# Patient Record
Sex: MALE | Race: WHITE | Employment: FULL TIME | URBAN - METROPOLITAN AREA
[De-identification: names, ages, dates, MRNs, and addresses within clinical notes are randomized per-mention and may not be internally consistent; named-entity substitution may affect disease eponyms.]

---

## 2020-12-20 LAB — COLOGUARD TEST, EXTERNAL: NEGATIVE

## 2023-03-18 ENCOUNTER — APPOINTMENT (OUTPATIENT)
Dept: CT IMAGING | Age: 68
DRG: 041 | End: 2023-03-18
Attending: EMERGENCY MEDICINE
Payer: MEDICARE

## 2023-03-18 ENCOUNTER — HOSPITAL ENCOUNTER (INPATIENT)
Age: 68
LOS: 4 days | Discharge: NURSING, REHAB OR LONG-TERM CARE | DRG: 041 | End: 2023-03-22
Attending: EMERGENCY MEDICINE | Admitting: FAMILY MEDICINE
Payer: MEDICARE

## 2023-03-18 ENCOUNTER — APPOINTMENT (OUTPATIENT)
Dept: GENERAL RADIOLOGY | Age: 68
DRG: 041 | End: 2023-03-18
Attending: EMERGENCY MEDICINE
Payer: MEDICARE

## 2023-03-18 DIAGNOSIS — R53.1 RIGHT SIDED WEAKNESS: ICD-10-CM

## 2023-03-18 DIAGNOSIS — Z91.14 NON COMPLIANCE W MEDICATION REGIMEN: ICD-10-CM

## 2023-03-18 DIAGNOSIS — I63.9 CEREBROVASCULAR ACCIDENT (CVA), UNSPECIFIED MECHANISM (HCC): Primary | ICD-10-CM

## 2023-03-18 DIAGNOSIS — R47.1 DYSARTHRIA: ICD-10-CM

## 2023-03-18 LAB
ALBUMIN SERPL-MCNC: 4.2 G/DL (ref 3.5–5)
ALBUMIN/GLOB SERPL: 1.2 (ref 1.1–2.2)
ALP SERPL-CCNC: 64 U/L (ref 45–117)
ALT SERPL-CCNC: 28 U/L (ref 12–78)
ANION GAP SERPL CALC-SCNC: 4 MMOL/L (ref 5–15)
AST SERPL-CCNC: 17 U/L (ref 15–37)
BASOPHILS # BLD: 0 K/UL (ref 0–0.1)
BASOPHILS NFR BLD: 1 % (ref 0–1)
BILIRUB SERPL-MCNC: 0.5 MG/DL (ref 0.2–1)
BUN SERPL-MCNC: 17 MG/DL (ref 6–20)
BUN/CREAT SERPL: 13 (ref 12–20)
CALCIUM SERPL-MCNC: 9.4 MG/DL (ref 8.5–10.1)
CHLORIDE SERPL-SCNC: 100 MMOL/L (ref 97–108)
CO2 SERPL-SCNC: 31 MMOL/L (ref 21–32)
COMMENT, HOLDF: NORMAL
CREAT SERPL-MCNC: 1.31 MG/DL (ref 0.7–1.3)
DIFFERENTIAL METHOD BLD: NORMAL
EOSINOPHIL # BLD: 0.1 K/UL (ref 0–0.4)
EOSINOPHIL NFR BLD: 2 % (ref 0–7)
ERYTHROCYTE [DISTWIDTH] IN BLOOD BY AUTOMATED COUNT: 12.4 % (ref 11.5–14.5)
GLOBULIN SER CALC-MCNC: 3.4 G/DL (ref 2–4)
GLUCOSE BLD STRIP.AUTO-MCNC: 81 MG/DL (ref 65–117)
GLUCOSE SERPL-MCNC: 87 MG/DL (ref 65–100)
HCT VFR BLD AUTO: 42 % (ref 36.6–50.3)
HGB BLD-MCNC: 14.6 G/DL (ref 12.1–17)
IMM GRANULOCYTES # BLD AUTO: 0 K/UL (ref 0–0.04)
IMM GRANULOCYTES NFR BLD AUTO: 0 % (ref 0–0.5)
INR PPP: 1 (ref 0.9–1.1)
LYMPHOCYTES # BLD: 1.8 K/UL (ref 0.8–3.5)
LYMPHOCYTES NFR BLD: 20 % (ref 12–49)
MCH RBC QN AUTO: 29.4 PG (ref 26–34)
MCHC RBC AUTO-ENTMCNC: 34.8 G/DL (ref 30–36.5)
MCV RBC AUTO: 84.7 FL (ref 80–99)
MONOCYTES # BLD: 1 K/UL (ref 0–1)
MONOCYTES NFR BLD: 11 % (ref 5–13)
NEUTS SEG # BLD: 5.9 K/UL (ref 1.8–8)
NEUTS SEG NFR BLD: 66 % (ref 32–75)
NRBC # BLD: 0 K/UL (ref 0–0.01)
NRBC BLD-RTO: 0 PER 100 WBC
PLATELET # BLD AUTO: 271 K/UL (ref 150–400)
PMV BLD AUTO: 10 FL (ref 8.9–12.9)
POTASSIUM SERPL-SCNC: 3.3 MMOL/L (ref 3.5–5.1)
PROT SERPL-MCNC: 7.6 G/DL (ref 6.4–8.2)
PROTHROMBIN TIME: 10.4 SEC (ref 9–11.1)
RBC # BLD AUTO: 4.96 M/UL (ref 4.1–5.7)
SAMPLES BEING HELD,HOLD: NORMAL
SERVICE CMNT-IMP: NORMAL
SODIUM SERPL-SCNC: 135 MMOL/L (ref 136–145)
TROPONIN I SERPL HS-MCNC: 7 NG/L (ref 0–57)
WBC # BLD AUTO: 8.9 K/UL (ref 4.1–11.1)

## 2023-03-18 PROCEDURE — 74011000636 HC RX REV CODE- 636: Performed by: RADIOLOGY

## 2023-03-18 PROCEDURE — 70496 CT ANGIOGRAPHY HEAD: CPT

## 2023-03-18 PROCEDURE — 71045 X-RAY EXAM CHEST 1 VIEW: CPT

## 2023-03-18 PROCEDURE — 93005 ELECTROCARDIOGRAM TRACING: CPT

## 2023-03-18 PROCEDURE — 85025 COMPLETE CBC W/AUTO DIFF WBC: CPT

## 2023-03-18 PROCEDURE — 96374 THER/PROPH/DIAG INJ IV PUSH: CPT

## 2023-03-18 PROCEDURE — 74011250636 HC RX REV CODE- 250/636: Performed by: EMERGENCY MEDICINE

## 2023-03-18 PROCEDURE — 0042T CT CODE NEURO PERF W CBF: CPT

## 2023-03-18 PROCEDURE — 80053 COMPREHEN METABOLIC PANEL: CPT

## 2023-03-18 PROCEDURE — 82962 GLUCOSE BLOOD TEST: CPT

## 2023-03-18 PROCEDURE — 72125 CT NECK SPINE W/O DYE: CPT

## 2023-03-18 PROCEDURE — 99285 EMERGENCY DEPT VISIT HI MDM: CPT

## 2023-03-18 PROCEDURE — 84484 ASSAY OF TROPONIN QUANT: CPT

## 2023-03-18 PROCEDURE — 36415 COLL VENOUS BLD VENIPUNCTURE: CPT

## 2023-03-18 PROCEDURE — 74011250637 HC RX REV CODE- 250/637: Performed by: EMERGENCY MEDICINE

## 2023-03-18 PROCEDURE — 65270000046 HC RM TELEMETRY

## 2023-03-18 PROCEDURE — 85610 PROTHROMBIN TIME: CPT

## 2023-03-18 PROCEDURE — 70450 CT HEAD/BRAIN W/O DYE: CPT

## 2023-03-18 PROCEDURE — 74011250636 HC RX REV CODE- 250/636: Performed by: FAMILY MEDICINE

## 2023-03-18 RX ORDER — ENOXAPARIN SODIUM 100 MG/ML
40 INJECTION SUBCUTANEOUS DAILY
Status: DISCONTINUED | OUTPATIENT
Start: 2023-03-19 | End: 2023-03-22 | Stop reason: HOSPADM

## 2023-03-18 RX ORDER — GUAIFENESIN 100 MG/5ML
81 LIQUID (ML) ORAL DAILY
Status: DISCONTINUED | OUTPATIENT
Start: 2023-03-19 | End: 2023-03-22 | Stop reason: HOSPADM

## 2023-03-18 RX ORDER — HYDRALAZINE HYDROCHLORIDE 20 MG/ML
20 INJECTION INTRAMUSCULAR; INTRAVENOUS
Status: DISCONTINUED | OUTPATIENT
Start: 2023-03-18 | End: 2023-03-19

## 2023-03-18 RX ORDER — GUAIFENESIN 100 MG/5ML
324 LIQUID (ML) ORAL
Status: COMPLETED | OUTPATIENT
Start: 2023-03-18 | End: 2023-03-18

## 2023-03-18 RX ORDER — ONDANSETRON 2 MG/ML
4 INJECTION INTRAMUSCULAR; INTRAVENOUS
Status: DISCONTINUED | OUTPATIENT
Start: 2023-03-18 | End: 2023-03-22 | Stop reason: HOSPADM

## 2023-03-18 RX ORDER — ATORVASTATIN CALCIUM 10 MG/1
40 TABLET, FILM COATED ORAL DAILY
Status: DISCONTINUED | OUTPATIENT
Start: 2023-03-19 | End: 2023-03-19

## 2023-03-18 RX ORDER — ACETAMINOPHEN 650 MG/1
650 SUPPOSITORY RECTAL
Status: DISCONTINUED | OUTPATIENT
Start: 2023-03-18 | End: 2023-03-22 | Stop reason: HOSPADM

## 2023-03-18 RX ORDER — SODIUM CHLORIDE 9 MG/ML
75 INJECTION, SOLUTION INTRAVENOUS CONTINUOUS
Status: DISCONTINUED | OUTPATIENT
Start: 2023-03-18 | End: 2023-03-22 | Stop reason: HOSPADM

## 2023-03-18 RX ORDER — SODIUM CHLORIDE 0.9 % (FLUSH) 0.9 %
5-40 SYRINGE (ML) INJECTION EVERY 8 HOURS
Status: DISCONTINUED | OUTPATIENT
Start: 2023-03-18 | End: 2023-03-22 | Stop reason: HOSPADM

## 2023-03-18 RX ORDER — SODIUM CHLORIDE 0.9 % (FLUSH) 0.9 %
5-40 SYRINGE (ML) INJECTION AS NEEDED
Status: DISCONTINUED | OUTPATIENT
Start: 2023-03-18 | End: 2023-03-22 | Stop reason: HOSPADM

## 2023-03-18 RX ORDER — ACETAMINOPHEN 325 MG/1
650 TABLET ORAL
Status: DISCONTINUED | OUTPATIENT
Start: 2023-03-18 | End: 2023-03-22 | Stop reason: HOSPADM

## 2023-03-18 RX ORDER — ONDANSETRON 4 MG/1
4 TABLET, ORALLY DISINTEGRATING ORAL
Status: DISCONTINUED | OUTPATIENT
Start: 2023-03-18 | End: 2023-03-22 | Stop reason: HOSPADM

## 2023-03-18 RX ORDER — HYDRALAZINE HYDROCHLORIDE 20 MG/ML
10 INJECTION INTRAMUSCULAR; INTRAVENOUS ONCE
Status: COMPLETED | OUTPATIENT
Start: 2023-03-18 | End: 2023-03-18

## 2023-03-18 RX ORDER — POLYETHYLENE GLYCOL 3350 17 G/17G
17 POWDER, FOR SOLUTION ORAL DAILY PRN
Status: DISCONTINUED | OUTPATIENT
Start: 2023-03-18 | End: 2023-03-22 | Stop reason: HOSPADM

## 2023-03-18 RX ADMIN — HYDRALAZINE HYDROCHLORIDE 10 MG: 20 INJECTION INTRAMUSCULAR; INTRAVENOUS at 18:10

## 2023-03-18 RX ADMIN — SODIUM CHLORIDE 75 ML/HR: 900 INJECTION, SOLUTION INTRAVENOUS at 19:02

## 2023-03-18 RX ADMIN — ASPIRIN 81 MG CHEWABLE TABLET 324 MG: 81 TABLET CHEWABLE at 19:11

## 2023-03-18 RX ADMIN — IOPAMIDOL 40 ML: 755 INJECTION, SOLUTION INTRAVENOUS at 17:42

## 2023-03-18 RX ADMIN — HYDRALAZINE HYDROCHLORIDE 20 MG: 20 INJECTION INTRAMUSCULAR; INTRAVENOUS at 21:47

## 2023-03-18 RX ADMIN — IOPAMIDOL 80 ML: 755 INJECTION, SOLUTION INTRAVENOUS at 17:42

## 2023-03-18 NOTE — PROGRESS NOTES
Neurocritical Care Code Stroke Documentation      Symptoms:   Right-sided weakness, right facial droop, slurred speech  Reportedly has had some double vision since Thursday. He went to [facility] to get some eyeglasses on his own and now he reports his double vision cleared up today. Hypertensive on arrival in triage, with /165. Patient reportedly has also had a few falls. Last Known Well: 0900 pm last night    Medical hx: Hypertension    Anticoagulation: None    VAN:   Negative   NIHSS:   1a-LOC:0    1b-Month/Age:1    1c-Open/Close Hand:0    2-Best Gaze:0    3-Visual Fields:0    4-Facial Palsy:1 (right facial droop)    5a-Left Arm:0    5b-Right Arm:1    6a-Left Le    6b-Right Le    7-Limb Ataxia:0    8-Sensory:0    9-Best Language:0    10-Dysarthria:1    11-Extinction/Inattention:0  TOTAL SCORE:5   Imaging:   CT: IMPRESSION  No evidence of acute intracranial abnormality. 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 CTA/CTP: IMPRESSION     CT Brain Perfusion:  No blood flow or volume deficits to suggest acute ischemia or tissue at ischemic risk. CTA Head:  No large vessel occlusion. Patchy area of vascular narrowing involving the right vertebral artery and left greater than right PCAs may suggest vasospasm versus noncalcified plaques. Clinical correlation advised. No aneurysm. CTA Neck:  Probable high-grade ostial stenosis of the left vertebral artery, suboptimally evaluated due to densely calcified plaques. No large vessel occlusion or aneurysm. Others:  Multilevel spondylosis with at least moderate to severe right spinal canal narrowing at C3-C6. Plan:   TNK Candidate: NO    Mechanical thrombectomy Candidate: NO     Discussed with Dr. Brock Flanagan. Arrival time: 3486  Time spent: 20 minutes.      Sekou Carrasco NP

## 2023-03-18 NOTE — Clinical Note
Left chest clipped prepped with ChloraPrep and draped. Wet prep solution applied at: 1118. Wet prep solution dried at: 1121. Wet prep elapsed drying time: 3 mins.

## 2023-03-18 NOTE — ED TRIAGE NOTES
Triage: Pt arrives from home accompanied by his family who reports pt has had 2 falls today due to right sided weakness. They also report pt has had slurred speech. These symptoms started this morning when he woke. He was well around 9pm last night when going to bed. Pt has marked facial droop in triage. He endorses diplopia on Thursday that he was seen by his PCP for and has now resolved.

## 2023-03-18 NOTE — ED PROVIDER NOTES
[facility]   29-year-old man with a past medical history of hypertension who presents to the emergency department as a level 2 code stroke. Patient's last known normal was 9 PM last night. He woke up this morning with slurred speech and right-sided weakness. He has had 2 falls that he and his family feel are secondary to the weakness. No headache or visual changes. No numbness. Patient is apparently not compliant with his blood pressure medication every day and often his wife states he runs very high. No prior strokes per his knowledge. No blood thinners. Past Medical History:   Diagnosis Date    Hypertension        History reviewed. No pertinent surgical history. History reviewed. No pertinent family history. Social History     Socioeconomic History    Marital status:      Spouse name: Not on file    Number of children: Not on file    Years of education: Not on file    Highest education level: Not on file   Occupational History    Not on file   Tobacco Use    Smoking status: Not on file    Smokeless tobacco: Not on file   Substance and Sexual Activity    Alcohol use: Not on file    Drug use: Not on file    Sexual activity: Not on file   Other Topics Concern    Not on file   Social History Narrative    Not on file     Social Determinants of Health     Financial Resource Strain: Not on file   Food Insecurity: Not on file   Transportation Needs: Not on file   Physical Activity: Not on file   Stress: Not on file   Social Connections: Not on file   Intimate Partner Violence: Not on file   Housing Stability: Not on file         ALLERGIES: Patient has no known allergies.     Review of Systems  A complete review of systems was performed and all systems reviewed are negative unless otherwise documented in the [facility]  Vitals:    03/18/23 1656 03/18/23 1739 03/18/23 1741   BP: (!) 237/165 (!) 200/114 (!) 198/122   Pulse: 89  64   Resp: 16  17   Temp: 98.3 °F (36.8 °C)     SpO2: 100%  98%   Weight: 77.8 kg (171 lb 8.3 oz)              Physical Exam  Constitutional:       Comments: Patient intermittently tearful but not diaphoretic or in extremis   HENT:      Head:      Comments: No appreciable signs of trauma of the head or scalp     Mouth/Throat:      Comments: Moist mucous membranes  Eyes:      Extraocular Movements: Extraocular movements intact. Comments: Pupils are 2 mm reactive to light bilaterally. Gaze midline   Neck:      Comments: No cervical spine tenderness  Cardiovascular:      Rate and Rhythm: Normal rate and regular rhythm. Heart sounds: No murmur heard. Pulmonary:      Effort: Pulmonary effort is normal. No respiratory distress. Breath sounds: Normal breath sounds. No wheezing or rales. Abdominal:      General: There is no distension. Palpations: Abdomen is soft. Tenderness: There is no abdominal tenderness. Musculoskeletal:         General: No deformity. Normal range of motion. Cervical back: Normal range of motion. Skin:     General: Skin is warm and dry. Neurological:      Comments: Right-sided facial droop. Dysarthria. No aphasia. GCS 15, but name the month incorrectly. Drift in the right upper and lower extremity. No ataxia. No sensory deficits. No visual field deficits. NIH stroke scale of 5        Medical Decision Making  Amount and/or Complexity of Data Reviewed  Labs: ordered. Radiology: ordered. ECG/medicine tests: ordered. Risk  Prescription drug management. Decision regarding hospitalization. 21-year-old presenting with the above chief complaint. Significant hypertension at 237/165 on arrival.  On exam he has a right-sided facial droop, dysarthria, and right-sided weakness. He was also not able to tell me the month. NIH stroke scale was 5. Not a tPA candidate given his last known well time. We will proceed with CT imaging. He will need admission. EKG shows a normal sinus rhythm with a rate of 66. QTc 482.   No concerning ST elevations or depressions. No arrhythmias. Perfect Serve Consult for Admission  6:10 PM    ED Room Number: ER18/18  Patient Name and age:  Elham Lechuga 74 y.o.  male  Working Diagnosis:   1. Cerebrovascular accident (CVA), unspecified mechanism ([facility] [address]    2. Right sided weakness    3. Dysarthria        COVID-19 Suspicion:  no  Sepsis present:  no  Reassessment needed: yes  Code Status:  Full Code  Readmission: no  Isolation Requirements:  no  Recommended Level of Care:  telemetry  Department: [facility] Adult ED - 21       Other: Patient presenting with right-sided weakness, dysarthria, and facial droop. Last known normal 9 PM last night. NIH stroke scale of 5. No bleed on CT. Teleneurology thinks this may possibly be hypertensive encephalopathy so I am going to give him a small dose of hydralazine. They want to keep his blood pressure around 498 systolic for now and then 20% thereafter.     Procedures

## 2023-03-18 NOTE — H&P
History and Physical    Date of Service:  3/18/2023  Primary Care Provider: Other, MD Ayah  Source of information: The patient and Chart review    Chief Complaint: Dysarthria and Fall      History of Presenting Illness:   Hipolito Feliciano is a 74 y.o. male who presents with slurred speech and right-sided weakness. Patient's symptoms started this morning, last known well time was around 9 PM last night when he went to bed. Patient presented under code stroke protocol. CT head with perfusion shows no acute pathology. CTA of the head shows no large vessel occlusion, there are patchy areas of vascular narrowing involving the right vertebral artery and left greater than right PCA's which may suggest vasospasm versus noncalcified plaques. Also probable high-grade ostial stenosis of the left vertebral artery. Patient also with pretty elevated blood pressure on presentation with initial blood pressure of 237/165. Patient was evaluated by teleneurology, recommended lowering blood pressure. Patient to be admitted for further evaluation and work-up. The patient denies any headache, blurry vision, sore throat, trouble swallowing, trouble with speech, chest pain, SOB, cough, fever, chills, N/V/D, abd pain, urinary symptoms, constipation, recent travels, sick contacts, focal or generalized neurological symptoms, falls, injuries, rashes, contact with COVID-19 diagnosed patients, hematemesis, melena, hemoptysis, hematuria, rashes, denies starting any new medications and denies any other concerns or problems besides as mentioned above. REVIEW OF SYSTEMS:  A comprehensive review of systems was negative except for that written in the History of Present Illness. Past Medical History:   Diagnosis Date    Hypertension       History reviewed. No pertinent surgical history. Prior to Admission medications    Not on File     No Known Allergies   History reviewed. No pertinent family history.    Social History: Social Determinants of Health     Tobacco Use: Not on file   Alcohol Use: Not on file   Financial Resource Strain: Not on file   Food Insecurity: Not on file   Transportation Needs: Not on file   Physical Activity: Not on file   Stress: Not on file   Social Connections: Not on file   Intimate Partner Violence: Not on file   Depression: Not on file   Housing Stability: Not on file        Medications were reconciled to the best of my ability given all available resources at the time of admission. Route is PO if not otherwise noted. Family and social history were personally reviewed, all pertinent and relevant details are outlined as above.     Objective:   Visit Vitals  BP (!) 196/119   Pulse 67   Temp 98.3 °F (36.8 °C)   Resp 17   Wt 77.8 kg (171 lb 8.3 oz)   SpO2 98%      O2 Device: None    PHYSICAL EXAM:   General: Alert x oriented x 3, awake, no acute distress,   HEENT: PEERL, EOMI, moist mucus membranes  Neck: Supple, no JVD, no meningeal signs  Chest: Clear to auscultation bilaterally   CVS: RRR, S1 S2 heard, no murmurs/rubs/gallops  Abd: Soft, non-tender, non-distended, +bowel sounds   Ext: No clubbing, no cyanosis, no edema  Neuro/Psych: Pleasant mood and affect, CN 2-12 grossly intact, sensory grossly within normal limit, Strength 5/5 in all extremities, DTR 1+ x 4  Cap refill: Brisk, less than 3 seconds  Pulses: 2+, symmetric in all extremities  Skin: Warm, dry, without rashes or lesions    Data Review:   I have independently reviewed and interpreted patient's lab and all other diagnostic data    Abnormal Labs Reviewed   METABOLIC PANEL, COMPREHENSIVE - Abnormal; Notable for the following components:       Result Value    Sodium 135 (*)     Potassium 3.3 (*)     Anion gap 4 (*)     Creatinine 1.31 (*)     All other components within normal limits       All Micro Results       None            IMAGING:   XR CHEST PORT   Final Result      No acute process on portable chest.         CT SPINE CERV WO CONT Final Result      1. No acute osseous abnormality. 2. Multilevel degenerative spondylosis. CTA CODE NEURO HEAD AND NECK W CONT   Final Result      CT Brain Perfusion:   No blood flow or volume deficits to suggest acute ischemia or tissue at ischemic   risk. CTA Head:   No large vessel occlusion. Patchy area of vascular narrowing involving the right vertebral artery and left   greater than right PCAs may suggest vasospasm versus noncalcified plaques. Clinical correlation advised. No aneurysm. CTA Neck:   Probable high-grade ostial stenosis of the left vertebral artery, suboptimally   evaluated due to densely calcified plaques. No large vessel occlusion or aneurysm. Others:   Multilevel spondylosis with at least moderate to severe right spinal canal   narrowing at C3-C6. CT CODE NEURO PERF W CBF   Final Result      CT Brain Perfusion:   No blood flow or volume deficits to suggest acute ischemia or tissue at ischemic   risk. CTA Head:   No large vessel occlusion. Patchy area of vascular narrowing involving the right vertebral artery and left   greater than right PCAs may suggest vasospasm versus noncalcified plaques. Clinical correlation advised. No aneurysm. CTA Neck:   Probable high-grade ostial stenosis of the left vertebral artery, suboptimally   evaluated due to densely calcified plaques. No large vessel occlusion or aneurysm. Others:   Multilevel spondylosis with at least moderate to severe right spinal canal   narrowing at C3-C6. CT CODE NEURO HEAD WO CONTRAST   Final Result   No evidence of acute intracranial abnormality. Chronic changes right greater than left ganglial capsular infarct and moderate   to severe microangiopathic white matter disease.            ECG/ECHO:    Results for orders placed or performed during the hospital encounter of 03/18/23   EKG, 12 LEAD, INITIAL   Result Value Ref Range Ventricular Rate 66 BPM    Atrial Rate 66 BPM    P-R Interval 176 ms    QRS Duration 116 ms    Q-T Interval 460 ms    QTC Calculation (Bezet) 482 ms    Calculated P Axis 92 degrees    Calculated T Axis 153 degrees    Diagnosis       ** Poor data quality, interpretation may be adversely affected  Normal sinus rhythm  Left ventricular hypertrophy with QRS widening and repolarization abnormality   ( R in aVL , Wilton product )  Prolonged QT  Abnormal ECG  No previous ECGs available            Notes reviewed from all clinical/nonclinical/nursing services involved in patient's clinical care. Care coordination discussions were held with appropriate clinical/nonclinical/ nursing providers based on care coordination needs. Assessment:   Given the patient's current clinical presentation, there is a high level of concern for decompensation if discharged from the emergency department. Complex decision making was performed, which includes reviewing the patient's available past medical records, laboratory results, and imaging studies. Active Problems:    * No active hospital problems.  *      Plan:     Dysarthria and right-sided weakness  -CVA versus TIA versus hypertensive encephalopathy  -Initial CT head with perfusion shows no ischemia  -CTA of the head and neck with concern for vascular narrowing involving the right vertebral artery and PCAs, also high-grade ostial stenosis of the left vertebral artery  -Admit to neuro, patient already received aspirin in the ER, check MRI of the brain, check echo  -Neurology and neuro interventional surgery consult    Hypertensive emergency  -Patient to receive IV hydralazine and plan for another 20% reduction of blood pressure in the next 24 hours    Hyponatremia  -Mild hyponatremia, likely hypovolemic versus other etiology  -Repeat labs in a.m. start gentle IV fluid hydration    CHEYANNE  -Patient with normal renal function on recent labs  -Start gentle IV fluid hydration and repeat renal function in a.m. Estimated length of stay is 2 midnights. Care plan discussed with patient's wife and son present at bedside. DIET: DIET NPO   ISOLATION PRECAUTIONS: There are currently no Active Isolations  CODE STATUS: No Order   DVT PROPHYLAXIS: Lovenox  FUNCTIONAL STATUS PRIOR TO HOSPITALIZATION: Fully active and ambulatory; able to carry on all self-care without restriction. Ambulatory status/function: By self   EARLY MOBILITY ASSESSMENT: Recommend an assessment from physical therapy and/or occupational therapy  ANTICIPATED DISCHARGE: 24-48 hours. ANTICIPATED DISPOSITION: Home  EMERGENCY CONTACT/SURROGATE DECISION MAKER:     CRITICAL CARE WAS PERFORMED FOR THIS ENCOUNTER: NO.      Signed By: Robina Frederick MD     March 18, 2023         Please note that this dictation may have been completed with Dragon, the computer voice recognition software. Quite often unanticipated grammatical, syntax, homophones, and other interpretive errors are inadvertently transcribed by the computer software. Please disregard these errors. Please excuse any errors that have escaped final proofreading.

## 2023-03-19 ENCOUNTER — APPOINTMENT (OUTPATIENT)
Dept: MRI IMAGING | Age: 68
DRG: 041 | End: 2023-03-19
Payer: MEDICARE

## 2023-03-19 LAB
ANION GAP SERPL CALC-SCNC: 5 MMOL/L (ref 5–15)
ATRIAL RATE: 100 BPM
ATRIAL RATE: 66 BPM
BUN SERPL-MCNC: 15 MG/DL (ref 6–20)
BUN/CREAT SERPL: 16 (ref 12–20)
CALCIUM SERPL-MCNC: 9.4 MG/DL (ref 8.5–10.1)
CALCULATED P AXIS, ECG09: 73 DEGREES
CALCULATED P AXIS, ECG09: 92 DEGREES
CALCULATED R AXIS, ECG10: 56 DEGREES
CALCULATED T AXIS, ECG11: -133 DEGREES
CALCULATED T AXIS, ECG11: 153 DEGREES
CHLORIDE SERPL-SCNC: 103 MMOL/L (ref 97–108)
CHOLEST SERPL-MCNC: 238 MG/DL
CO2 SERPL-SCNC: 26 MMOL/L (ref 21–32)
COMMENT, HOLDF: NORMAL
CREAT SERPL-MCNC: 0.96 MG/DL (ref 0.7–1.3)
DIAGNOSIS, 93000: NORMAL
DIAGNOSIS, 93000: NORMAL
EST. AVERAGE GLUCOSE BLD GHB EST-MCNC: 97 MG/DL
GLUCOSE SERPL-MCNC: 120 MG/DL (ref 65–100)
HBA1C MFR BLD: 5 % (ref 4–5.6)
HDLC SERPL-MCNC: 39 MG/DL
HDLC SERPL: 6.1 (ref 0–5)
LDLC SERPL CALC-MCNC: 165.4 MG/DL (ref 0–100)
P-R INTERVAL, ECG05: 176 MS
P-R INTERVAL, ECG05: 184 MS
POTASSIUM SERPL-SCNC: 3.1 MMOL/L (ref 3.5–5.1)
Q-T INTERVAL, ECG07: 378 MS
Q-T INTERVAL, ECG07: 460 MS
QRS DURATION, ECG06: 116 MS
QRS DURATION, ECG06: 126 MS
QTC CALCULATION (BEZET), ECG08: 482 MS
QTC CALCULATION (BEZET), ECG08: 487 MS
SAMPLES BEING HELD,HOLD: NORMAL
SODIUM SERPL-SCNC: 134 MMOL/L (ref 136–145)
TRIGL SERPL-MCNC: 168 MG/DL (ref ?–150)
TSH SERPL DL<=0.05 MIU/L-ACNC: 2.54 UIU/ML (ref 0.36–3.74)
VENTRICULAR RATE, ECG03: 100 BPM
VENTRICULAR RATE, ECG03: 66 BPM
VLDLC SERPL CALC-MCNC: 33.6 MG/DL

## 2023-03-19 PROCEDURE — 84443 ASSAY THYROID STIM HORMONE: CPT

## 2023-03-19 PROCEDURE — 74011250637 HC RX REV CODE- 250/637: Performed by: FAMILY MEDICINE

## 2023-03-19 PROCEDURE — 74011000250 HC RX REV CODE- 250: Performed by: NURSE PRACTITIONER

## 2023-03-19 PROCEDURE — 74011000250 HC RX REV CODE- 250: Performed by: STUDENT IN AN ORGANIZED HEALTH CARE EDUCATION/TRAINING PROGRAM

## 2023-03-19 PROCEDURE — 83036 HEMOGLOBIN GLYCOSYLATED A1C: CPT

## 2023-03-19 PROCEDURE — 74011000250 HC RX REV CODE- 250: Performed by: FAMILY MEDICINE

## 2023-03-19 PROCEDURE — 80061 LIPID PANEL: CPT

## 2023-03-19 PROCEDURE — 80048 BASIC METABOLIC PNL TOTAL CA: CPT

## 2023-03-19 PROCEDURE — A9576 INJ PROHANCE MULTIPACK: HCPCS

## 2023-03-19 PROCEDURE — 74011250637 HC RX REV CODE- 250/637

## 2023-03-19 PROCEDURE — 70553 MRI BRAIN STEM W/O & W/DYE: CPT

## 2023-03-19 PROCEDURE — 65270000046 HC RM TELEMETRY

## 2023-03-19 PROCEDURE — 36415 COLL VENOUS BLD VENIPUNCTURE: CPT

## 2023-03-19 PROCEDURE — 99255 IP/OBS CONSLTJ NEW/EST HI 80: CPT | Performed by: PSYCHIATRY & NEUROLOGY

## 2023-03-19 PROCEDURE — 74011250636 HC RX REV CODE- 250/636: Performed by: FAMILY MEDICINE

## 2023-03-19 PROCEDURE — 74011250636 HC RX REV CODE- 250/636

## 2023-03-19 RX ORDER — LISINOPRIL AND HYDROCHLOROTHIAZIDE 20; 25 MG/1; MG/1
1.5 TABLET ORAL DAILY
COMMUNITY
End: 2023-03-22

## 2023-03-19 RX ORDER — ERGOCALCIFEROL 1.25 MG/1
50000 CAPSULE ORAL
COMMUNITY

## 2023-03-19 RX ORDER — LABETALOL HYDROCHLORIDE 5 MG/ML
10 INJECTION, SOLUTION INTRAVENOUS
Status: DISCONTINUED | OUTPATIENT
Start: 2023-03-19 | End: 2023-03-19

## 2023-03-19 RX ORDER — METOPROLOL SUCCINATE 100 MG/1
100 TABLET, EXTENDED RELEASE ORAL DAILY
COMMUNITY
End: 2023-03-22

## 2023-03-19 RX ORDER — SODIUM CHLORIDE 0.9 % (FLUSH) 0.9 %
10 SYRINGE (ML) INJECTION
Status: COMPLETED | OUTPATIENT
Start: 2023-03-19 | End: 2023-03-19

## 2023-03-19 RX ORDER — ATORVASTATIN CALCIUM 40 MG/1
80 TABLET, FILM COATED ORAL DAILY
Status: DISCONTINUED | OUTPATIENT
Start: 2023-03-19 | End: 2023-03-22 | Stop reason: HOSPADM

## 2023-03-19 RX ORDER — ROSUVASTATIN CALCIUM 40 MG/1
40 TABLET, COATED ORAL
COMMUNITY
End: 2023-03-22

## 2023-03-19 RX ORDER — LABETALOL HYDROCHLORIDE 5 MG/ML
10 INJECTION, SOLUTION INTRAVENOUS
Status: DISCONTINUED | OUTPATIENT
Start: 2023-03-19 | End: 2023-03-20

## 2023-03-19 RX ADMIN — GADOTERIDOL 15 ML: 279.3 INJECTION, SOLUTION INTRAVENOUS at 10:05

## 2023-03-19 RX ADMIN — ASPIRIN 81 MG CHEWABLE TABLET 81 MG: 81 TABLET CHEWABLE at 09:28

## 2023-03-19 RX ADMIN — SODIUM CHLORIDE, PRESERVATIVE FREE 10 ML: 5 INJECTION INTRAVENOUS at 10:05

## 2023-03-19 RX ADMIN — LABETALOL HYDROCHLORIDE 10 MG: 5 INJECTION INTRAVENOUS at 05:20

## 2023-03-19 RX ADMIN — ACETAMINOPHEN 650 MG: 325 TABLET ORAL at 20:05

## 2023-03-19 RX ADMIN — LABETALOL HYDROCHLORIDE 10 MG: 5 INJECTION INTRAVENOUS at 18:32

## 2023-03-19 RX ADMIN — ENOXAPARIN SODIUM 40 MG: 100 INJECTION SUBCUTANEOUS at 09:27

## 2023-03-19 RX ADMIN — SODIUM CHLORIDE 75 ML/HR: 900 INJECTION, SOLUTION INTRAVENOUS at 09:28

## 2023-03-19 RX ADMIN — ATORVASTATIN CALCIUM 80 MG: 40 TABLET, FILM COATED ORAL at 09:27

## 2023-03-19 RX ADMIN — SODIUM CHLORIDE, PRESERVATIVE FREE 10 ML: 5 INJECTION INTRAVENOUS at 14:16

## 2023-03-19 RX ADMIN — SODIUM CHLORIDE, PRESERVATIVE FREE 10 ML: 5 INJECTION INTRAVENOUS at 05:24

## 2023-03-19 RX ADMIN — LABETALOL HYDROCHLORIDE 10 MG: 5 INJECTION INTRAVENOUS at 12:08

## 2023-03-19 NOTE — ED NOTES
Verbal shift change report given to Nguyễn Bowman RN (oncoming nurse) by Thalia Rene RN (offgoing nurse). Report included the following information SBAR, ED Summary, MAR and Recent Results.

## 2023-03-19 NOTE — PROGRESS NOTES
Admission Medication Reconciliation:    Information obtained from:  Patient's wife/external chart review  RxQuery data available¹:  YES    Comments/Recommendations: Updated PTA meds/reviewed patient's allergies. 1)  Spoke with patient who know some of his medications. Confirmed with wife (by phone) and external notes medications. Per wife, patient does NOT take his medications on regular bases (he forgets) and has overall poor adherence. 2)  Medication:  -Metoprolol 100mg every day  -Lisinopril/HCTZ 20/25mg (increased daily dose from 1 tablet to 1.5 tablet)  -Vitamin D2 (new RX, started 3/16)  -Rosuvastatin 40mg (patient not adherent)     ¹RxQuery pharmacy benefit data reflects medications filled and processed through the patient's insurance, however   this data does NOT capture whether the medication was picked up or is currently being taken by the patient. Allergies:  Patient has no known allergies. Significant PMH/Disease States:   Past Medical History:   Diagnosis Date    Hypertension      Chief Complaint for this Admission:    Chief Complaint   Patient presents with    Dysarthria    Fall     Prior to Admission Medications:   Prior to Admission Medications   Prescriptions Last Dose Informant Taking?   ergocalciferol (Vitamin D2) 1,250 mcg (50,000 unit) capsule   Yes   Sig: Take 50,000 Units by mouth every seven (7) days. lisinopril-hydroCHLOROthiazide (PRINZIDE, ZESTORETIC) 20-25 mg per tablet   Yes   Sig: Take 1.5 Tablets by mouth daily. metoprolol succinate (TOPROL-XL) 100 mg tablet   Yes   Sig: Take 100 mg by mouth daily. Facility-Administered Medications: None     Please contact the main inpatient pharmacy with any questions or concerns at [phone] and we will direct you to the clinical pharmacist covering this patient's care while in-house.    Roni Martell, MELITOND

## 2023-03-19 NOTE — PROGRESS NOTES
Physical Therapy:     Orders received, chart reviewed, attempted to see patient for PT eric.  Pt currently [facility] for MRI. Will defer and follow up as able and appropriate.       Angy Chamorro, PT, DPT

## 2023-03-19 NOTE — PROGRESS NOTES
Occupational Therapy  3/19/2023    Orders received, chart reviewed, attempted to see patient for OT eval.  Pt currently [facility] for MRI. Will defer and follow up as able and appropriate. Thank you. Kay Sanchez MS, OTR/L

## 2023-03-19 NOTE — CONSULTS
NeuroInterventional Surgery Consult  Colby Habermann, NP    Patient: Carl Francisco MRN: [id number]  SSN: [id number]    YOB: 1955  Age: 74 y.o. Sex: male      Chief Complaint: GLF, slurred speech, right sided weakness    HPI:   74 y.o. -H WHITE/NON-  M with pmh of HTN and HLD, states he takes metoprolol, lisinopril, and crestor at home but has been noncompliant with taking them recently. He presented after having 2 GLF due to right sided weakness and slurred speech. Reports diplopia on Thursday that has resolved. Code stroke was called with LKW 9p 3/17 and an initial NIH of 5. CT showed chronic changes R>L ganglial capsular infract and CTA head and neck showed narrowing of right vertebral artery L>R and PCAs with possible vasospasm vs. noncalcified plaques. 324 mg ASA given in ED. NIS was consulted for vertebral artery stenosis. He states he drinks one gin and tonic a day, denies drugs or marijuana use. Discussed with Dr. Nilsa Hawkins, angiogram will not change plan of care from MRI and LP results. Past Medical History:   Diagnosis Date    Hypertension      History reviewed. No pertinent family history. Social History     Tobacco Use    Smoking status: Not on file    Smokeless tobacco: Not on file   Substance Use Topics    Alcohol use: Not on file      None       No Known Allergies    Review of Systems:  11pt ROS negative except for above    Objective:   Visit Vitals  BP (!) 175/110   Pulse 66   Temp 98.3 °F (36.8 °C)   Resp 18   Wt 77.8 kg (171 lb 8.3 oz)   SpO2 98%         Physical Exam:  GENERAL: Calm, cooperative, NAD  SKIN: Warm, dry, color appropriate for ethnicity. Neurologic Exam:  Mental Status:    Alert and oriented x 4. Appropriate affect, mood and behavior. Speech/Language:      Slurred, no receptive or expressive aphasia. Cranial Nerves:     Pupils 2 mm, equal, round and reactive to light. Visual fields full to confrontation. Extraocular movements intact. Facial sensation intact. R facial droop. Hearing grossly intact bilaterally. Tongue protrudes to midline, palate elevates symmetrically. Motor:      5/5 LLE and LUE, 3/5 RUE, 2/5 RLE. R arm drift, R leg hits the bed. Bulk and tone normal. No involuntary movements. Sensation:      Sensation intact throughout to light touch    Reflexes:      Negative Babinskis    Coordination: FTN intact LUE, too weak on RUE to test and HTS intact LLE, too weak for RLE, no ataxia. Gait: Deferred     Labs:  Recent Results (from the past 12 hour(s))   GLUCOSE, POC    Collection Time: 03/18/23  4:59 PM   Result Value Ref Range    Glucose (POC) 81 65 - 117 mg/dL    Performed by Eduardo Ramsey    CBC WITH AUTOMATED DIFF    Collection Time: 03/18/23  5:10 PM   Result Value Ref Range    WBC 8.9 4.1 - 11.1 K/uL    RBC 4.96 4.10 - 5.70 M/uL    HGB 14.6 12.1 - 17.0 g/dL    HCT 42.0 36.6 - 50.3 %    MCV 84.7 80.0 - 99.0 FL    MCH 29.4 26.0 - 34.0 PG    MCHC 34.8 30.0 - 36.5 g/dL    RDW 12.4 11.5 - 14.5 %    PLATELET 272 621 - 921 K/uL    MPV 10.0 8.9 - 12.9 FL    NRBC 0.0 0  WBC    ABSOLUTE NRBC 0.00 0.00 - 0.01 K/uL    NEUTROPHILS 66 32 - 75 %    LYMPHOCYTES 20 12 - 49 %    MONOCYTES 11 5 - 13 %    EOSINOPHILS 2 0 - 7 %    BASOPHILS 1 0 - 1 %    IMMATURE GRANULOCYTES 0 0.0 - 0.5 %    ABS. NEUTROPHILS 5.9 1.8 - 8.0 K/UL    ABS. LYMPHOCYTES 1.8 0.8 - 3.5 K/UL    ABS. MONOCYTES 1.0 0.0 - 1.0 K/UL    ABS. EOSINOPHILS 0.1 0.0 - 0.4 K/UL    ABS. BASOPHILS 0.0 0.0 - 0.1 K/UL    ABS. IMM.  GRANS. 0.0 0.00 - 0.04 K/UL    DF AUTOMATED     METABOLIC PANEL, COMPREHENSIVE    Collection Time: 03/18/23  5:10 PM   Result Value Ref Range    Sodium 135 (L) 136 - 145 mmol/L    Potassium 3.3 (L) 3.5 - 5.1 mmol/L    Chloride 100 97 - 108 mmol/L    CO2 31 21 - 32 mmol/L    Anion gap 4 (L) 5 - 15 mmol/L    Glucose 87 65 - 100 mg/dL    BUN 17 6 - 20 MG/DL    Creatinine 1.31 (H) 0.70 - 1.30 MG/DL    BUN/Creatinine ratio 13 12 - 20      eGFR >60 >60 ml/min/1.73m2    Calcium 9.4 8.5 - 10.1 MG/DL    Bilirubin, total 0.5 0.2 - 1.0 MG/DL    ALT (SGPT) 28 12 - 78 U/L    AST (SGOT) 17 15 - 37 U/L    Alk. phosphatase 64 45 - 117 U/L    Protein, total 7.6 6.4 - 8.2 g/dL    Albumin 4.2 3.5 - 5.0 g/dL    Globulin 3.4 2.0 - 4.0 g/dL    A-G Ratio 1.2 1.1 - 2.2     PROTHROMBIN TIME + INR    Collection Time: 03/18/23  5:10 PM   Result Value Ref Range    INR 1.0 0.9 - 1.1      Prothrombin time 10.4 9.0 - 11.1 sec   TROPONIN-HIGH SENSITIVITY    Collection Time: 03/18/23  5:10 PM   Result Value Ref Range    Troponin-High Sensitivity 7 0 - 57 ng/L   SAMPLES BEING HELD    Collection Time: 03/18/23  5:10 PM   Result Value Ref Range    SAMPLES BEING HELD 1RED     COMMENT        Add-on orders for these samples will be processed based on acceptable specimen integrity and analyte stability, which may vary by analyte.    EKG, 12 LEAD, INITIAL    Collection Time: 03/18/23  5:34 PM   Result Value Ref Range    Ventricular Rate 66 BPM    Atrial Rate 66 BPM    P-R Interval 176 ms    QRS Duration 116 ms    Q-T Interval 460 ms    QTC Calculation (Bezet) 482 ms    Calculated P Axis 92 degrees    Calculated T Axis 153 degrees    Diagnosis       ** Poor data quality, interpretation may be adversely affected  Normal sinus rhythm  Left ventricular hypertrophy with QRS widening and repolarization abnormality   ( R in aVL , Wilton product )  Prolonged QT  Abnormal ECG  No previous ECGs available          Imaging (my read):  CT head: chronic changes R>L ganglial capsular infarct   CTA head: no LVO, narrowing of R vertebral artery and L>R PCAs may suggest vasospam  CTA neck: stenosis of L vertebral artery     Assessment/Plan:   ICAD vs. Vasculopathy:  -HA1C, lipid panel and TSH pending  -EKG - NSR  -start lipitor 40 mg daily, may need to increase lipitor dose pending LDL  -recommend MRI brain w/ and w/o or LP  -recommend neurology input  -permissive HTN for 24-48 hours, then decrease by 20% after   -start on aspirin 81 mg daily    Further recommendations to come from Dr. Alvin Oneil and neurology. Thank you for this consult and participating in the care of this patient.     Signed By: Kenton Mckinney NP, Neurocritical Care Nurse Practitioner    March 18, 2023

## 2023-03-19 NOTE — CONSULTS
Neurology Consult Note     NAME: Jennifer Mann   :  1955   MRN:  [id number]   DATE:  3/19/2023       HPI:  Pt is a 60yo RH male admitted 3/18/23 after waking with dysarthria and right sided weakness, LKN 2100 3/17/23. /165. NIHSS score 5. CTH no acute changes, chronic bilateral BG infarcts and mod to severe CIWM changes. CTA H/N without LVO, patchy narrowing of right vert and bilateral PCAs suggestive of vasospasm vs non-calcified plaques, and severe stenosis of left vert with densely calcified plaques. MRI brain w/wo with acute infarcts in left thalamus, left frontal, right parietal, right occipital, right hakan, left cerebral peduncle. TTE pending. .4, HgbA1C 5.0. +HTN, non-complaint with meds, +HLD, non-compliant with meds. No DM, No CESIA, No smoking. No Afib. +CAD s/p stenting. Denies prior stroke. No APT/OAC. Adds that he had diplopia transiently 3 days PTA that he attributed to new reading glasses.      PMH:  HTN  HLD  CAD s/p stents  S/p ankle surgery  S/p knee surgery    ROS:  Per HPI o/w neg    MEDS:  Home:  None listed    Current Facility-Administered Medications:     atorvastatin (LIPITOR) tablet 80 mg, 80 mg, Oral, DAILY, Shania Austin NP, 80 mg at 23 0927    labetaloL (NORMODYNE;TRANDATE) injection 10 mg, 10 mg, IntraVENous, Q6H PRN, Davion Vogel MD, 10 mg at 23 1208    sodium chloride (NS) flush 5-40 mL, 5-40 mL, IntraVENous, Q8H, Gopal Farrell MD, 10 mL at 23 0524    sodium chloride (NS) flush 5-40 mL, 5-40 mL, IntraVENous, PRN, Gilberto Paniagua MD    acetaminophen (TYLENOL) tablet 650 mg, 650 mg, Oral, Q6H PRN **OR** acetaminophen (TYLENOL) suppository 650 mg, 650 mg, Rectal, Q6H PRN, Gopal Farrell MD    polyethylene glycol (MIRALAX) packet 17 g, 17 g, Oral, DAILY PRN, Gilberto Paniagua MD    ondansetron (Elicia [place] ODT) tablet 4 mg, 4 mg, Oral, Q8H PRN **OR** ondansetron (ZOFRAN) injection 4 mg, 4 mg, IntraVENous, Q6H PRN, Gopal Farrell MD    enoxaparin (LOVENOX) injection 40 mg, 40 mg, SubCUTAneous, DAILY, Gopal Farrell MD, 40 mg at 23 0927    0.9% sodium chloride infusion, 75 mL/hr, IntraVENous, CONTINUOUS, Gopal Farrell MD, Last Rate: 75 mL/hr at 23 0928, 75 mL/hr at 23 3881    aspirin chewable tablet 81 mg, 81 mg, Oral, DAILY, Shania Austin NP, 81 mg at 23 3607  No current outpatient medications on file. No Known Allergies      SH:  , lives with wife  +E - gin and tonic daily  No T/D    FH:  M- HTN, Lung CA  F - HTN, Pancreatic CA      PHYSICAL EXAM:    Visit Vitals  BP (!) 185/110 (BP 1 Location: Left upper arm, BP Patient Position: At rest)   Pulse 78   Temp 98 °F (36.7 °C)   Resp 13   Ht 5' 10\" (1.778 m)   Wt 171 lb 8.3 oz (77.8 kg)   SpO2 96%   BMI 24.61 kg/m²     Temp (24hrs), Av.2 °F (36.8 °C), Min:98 °F (36.7 °C), Max:98.3 °F (36.8 °C)    General: Well developed well nourished patient in no apparent distress. Cardiac: Regular rate and rhythm with no murmurs. Carotids: 2+ symmetric, no bruits  Extremities: 2+ Radial pulses, no cyanosis or edema    Neurological Exam:  Mental Status: Oriented to time, place and person. Speech - mild dysarthria. Language intact. Attention and fund of knowledge appropriate. Normal recent and remote memory. Cranial Nerves:   VFF, PERRL, EOMI, no nystagmus, no diplopia, no ptosis. Facial sensation is normal. Facial movement is asymmetric on right. Palate is midline. Tongue is midline. Hearing is intact   Motor:  5/5 on left. 4/5  on right, +PD, 4/5 HF, 2/5 DF. Normal bulk and tone. No tremors   Reflexes:   Deep tendon reflexes 2+ and symmetric. Toes downgoing.    Sensory:   Intact to LT and PP   Gait:  Deferred   Cerebellar:  Unable to assess due to patient factors         STUDIES AND REPORTS:  Recent Results (from the past 24 hour(s)) GLUCOSE, POC    Collection Time: 03/18/23  4:59 PM   Result Value Ref Range    Glucose (POC) 81 65 - 117 mg/dL    Performed by Ivis Cao    CBC WITH AUTOMATED DIFF    Collection Time: 03/18/23  5:10 PM   Result Value Ref Range    WBC 8.9 4.1 - 11.1 K/uL    RBC 4.96 4.10 - 5.70 M/uL    HGB 14.6 12.1 - 17.0 g/dL    HCT 42.0 36.6 - 50.3 %    MCV 84.7 80.0 - 99.0 FL    MCH 29.4 26.0 - 34.0 PG    MCHC 34.8 30.0 - 36.5 g/dL    RDW 12.4 11.5 - 14.5 %    PLATELET 533 385 - 941 K/uL    MPV 10.0 8.9 - 12.9 FL    NRBC 0.0 0  WBC    ABSOLUTE NRBC 0.00 0.00 - 0.01 K/uL    NEUTROPHILS 66 32 - 75 %    LYMPHOCYTES 20 12 - 49 %    MONOCYTES 11 5 - 13 %    EOSINOPHILS 2 0 - 7 %    BASOPHILS 1 0 - 1 %    IMMATURE GRANULOCYTES 0 0.0 - 0.5 %    ABS. NEUTROPHILS 5.9 1.8 - 8.0 K/UL    ABS. LYMPHOCYTES 1.8 0.8 - 3.5 K/UL    ABS. MONOCYTES 1.0 0.0 - 1.0 K/UL    ABS. EOSINOPHILS 0.1 0.0 - 0.4 K/UL    ABS. BASOPHILS 0.0 0.0 - 0.1 K/UL    ABS. IMM. GRANS. 0.0 0.00 - 0.04 K/UL    DF AUTOMATED     METABOLIC PANEL, COMPREHENSIVE    Collection Time: 03/18/23  5:10 PM   Result Value Ref Range    Sodium 135 (L) 136 - 145 mmol/L    Potassium 3.3 (L) 3.5 - 5.1 mmol/L    Chloride 100 97 - 108 mmol/L    CO2 31 21 - 32 mmol/L    Anion gap 4 (L) 5 - 15 mmol/L    Glucose 87 65 - 100 mg/dL    BUN 17 6 - 20 MG/DL    Creatinine 1.31 (H) 0.70 - 1.30 MG/DL    BUN/Creatinine ratio 13 12 - 20      eGFR >60 >60 ml/min/1.73m2    Calcium 9.4 8.5 - 10.1 MG/DL    Bilirubin, total 0.5 0.2 - 1.0 MG/DL    ALT (SGPT) 28 12 - 78 U/L    AST (SGOT) 17 15 - 37 U/L    Alk.  phosphatase 64 45 - 117 U/L    Protein, total 7.6 6.4 - 8.2 g/dL    Albumin 4.2 3.5 - 5.0 g/dL    Globulin 3.4 2.0 - 4.0 g/dL    A-G Ratio 1.2 1.1 - 2.2     PROTHROMBIN TIME + INR    Collection Time: 03/18/23  5:10 PM   Result Value Ref Range    INR 1.0 0.9 - 1.1      Prothrombin time 10.4 9.0 - 11.1 sec   TROPONIN-HIGH SENSITIVITY    Collection Time: 03/18/23  5:10 PM   Result Value Ref Range Troponin-High Sensitivity 7 0 - 57 ng/L   SAMPLES BEING HELD    Collection Time: 03/18/23  5:10 PM   Result Value Ref Range    SAMPLES BEING HELD 1RED     COMMENT        Add-on orders for these samples will be processed based on acceptable specimen integrity and analyte stability, which may vary by analyte.    EKG, 12 LEAD, INITIAL    Collection Time: 03/18/23  5:34 PM   Result Value Ref Range    Ventricular Rate 66 BPM    Atrial Rate 66 BPM    P-R Interval 176 ms    QRS Duration 116 ms    Q-T Interval 460 ms    QTC Calculation (Bezet) 482 ms    Calculated P Axis 92 degrees    Calculated T Axis 153 degrees    Diagnosis       ** Poor data quality, interpretation may be adversely affected  Normal sinus rhythm  Left ventricular hypertrophy with QRS widening and repolarization abnormality   ( R in aVL , Wilton product )  Prolonged QT  Abnormal ECG  No previous ECGs available  Confirmed by Rosa Florez ([id number]) on 3/19/2023 9:35:47 AM     EKG, 12 LEAD, INITIAL    Collection Time: 03/18/23  9:55 PM   Result Value Ref Range    Ventricular Rate 100 BPM    Atrial Rate 100 BPM    P-R Interval 184 ms    QRS Duration 126 ms    Q-T Interval 378 ms    QTC Calculation (Bezet) 487 ms    Calculated P Axis 73 degrees    Calculated R Axis 56 degrees    Calculated T Axis -133 degrees    Diagnosis       Normal sinus rhythm  Left ventricular hypertrophy with QRS widening and repolarization abnormality   ( Sokolow-Jordan )  Abnormal ECG  When compared with ECG of 18-MAR-2023 17:34, No significant change  Confirmed by Rosa Florez ([id number]) on 3/19/2023 9:38:54 AM     LIPID PANEL    Collection Time: 03/19/23  3:07 AM   Result Value Ref Range    Cholesterol, total 238 (H) <200 MG/DL    Triglyceride 168 (H) <150 MG/DL    HDL Cholesterol 39 MG/DL    LDL, calculated 165.4 (H) 0 - 100 MG/DL    VLDL, calculated 33.6 MG/DL    CHOL/HDL Ratio 6.1 (H) 0.0 - 5.0     HEMOGLOBIN A1C WITH EAG    Collection Time: 03/19/23  3:07 AM   Result Value Ref Range Hemoglobin A1c 5.0 4.0 - 5.6 %    Est. average glucose 97 mg/dL   TSH 3RD GENERATION    Collection Time: 03/19/23  3:07 AM   Result Value Ref Range    TSH 2.54 0.36 - 2.07 uIU/mL   METABOLIC PANEL, BASIC    Collection Time: 03/19/23  3:07 AM   Result Value Ref Range    Sodium 134 (L) 136 - 145 mmol/L    Potassium 3.1 (L) 3.5 - 5.1 mmol/L    Chloride 103 97 - 108 mmol/L    CO2 26 21 - 32 mmol/L    Anion gap 5 5 - 15 mmol/L    Glucose 120 (H) 65 - 100 mg/dL    BUN 15 6 - 20 MG/DL    Creatinine 0.96 0.70 - 1.30 MG/DL    BUN/Creatinine ratio 16 12 - 20      eGFR >60 >60 ml/min/1.73m2    Calcium 9.4 8.5 - 10.1 MG/DL   SAMPLES BEING HELD    Collection Time: 03/19/23  3:07 AM   Result Value Ref Range    SAMPLES BEING HELD 1BLUE     COMMENT        Add-on orders for these samples will be processed based on acceptable specimen integrity and analyte stability, which may vary by analyte. MRI Results (most recent):  Results from [place] encounter on 03/18/23    MRI BRAIN W WO CONT    Narrative  Clinical history: stroke  INDICATION:   stroke    COMPARISON: 3/18/2023  TECHNIQUE: MR examination of the brain includes axial and sagittal T1 , axial  T2, axial FLAIR, axial gradient echo, axial DWI, coronal T1 . Pre and post  contrast axial T1-weighted imaging. Postcontrast T1-weighted imaging coronal  plane. CONTRAST: ProHance  FINDINGS:  There is a small focus of infarction in the inferior left thalamus anteriorly  and in the occipital lobe on the right. Punctate infarct in the thalamus at the  pulvinar. Tiny focus of acute infarction at the inferior mesencephalic  periaqueductal fourth ventricle anteriorly on the right. There are confluent periventricular and scattered foci of increased T2 signal  intensity demonstrated in the corona radiata and the centrum semiovale. There is  sulcal and ventricular prominence.  There are small chronic infarctions in the  periventricular white matter of the right and left frontal lobe, chronic infarct  of the right and left basal ganglia as well. Increased T2 signal intensity focus  in the hakan. Otherwise; There is no abnormal parenchymal enhancement. There is no abnormal meningeal  enhancement demonstrated. The brain architecture is normal. There is no evidence  of midline shift or mass-effect. The ventricles are normal in size, position and  configuration. There are no extra-axial fluid collections. Major intracranial  vascular flow-voids are unremarkable. The orbits are grossly symmetric. Dural  venous sinuses are grossly unremarkable. There is no Chiari or syrinx. Pituitary  and infundibulum grossly unremarkable. Cerebellopontine angles are unremarkable. No skull base mass is identified. Cavernous sinuses are symmetric. The mastoid  air cells and are well pneumatized and clear. Impression  Small scattered foci of acute infarction are demonstrated. Inferior left thalamus, pulvinar of the left thalamus, periaqueductal  mesencephalic on the right. There is no associated hemorrhage, midline shift or mass effect. There is severe chronic microvascular ischemic change, moderate cerebral atrophy  and scattered chronic cerebral infarctions. No intracranial mass, hemorrhage . CT Results (most recent):  Results from Hospital Encounter encounter on 03/18/23    CT SPINE CERV WO CONT    Narrative  INDICATION:   fall; neuro deficits    COMPARISON: None. TECHNIQUE:   Noncontrast axial CT imaging of the cervical spine was performed. Coronal and sagittal reconstructions were obtained. CT dose reduction was achieved through the use of a standardized protocol  tailored for this examination and automatic exposure control for dose  modulation. FINDINGS:    There is no evidence of acute osseous abnormality. There is anterior fusion  hardware at C6-C7. There is no acute alignment abnormality. Vertebral body  heights are maintained.  There is no appreciable prevertebral soft tissue  swelling or epidural hematoma. There is multilevel degenerative spondylosis. There is multilevel bilateral neuroforaminal stenosis. Evaluation of the  paraspinal soft tissues demonstrates bilateral carotid artery atherosclerosis. The visualized lung apices are clear. Impression  1. No acute osseous abnormality. 2. Multilevel degenerative spondylosis. Assessment and Plan:   Pt is a 60yo RH male with HTN, HLD, CAD s/p stents, non-compliant with meds, not on any APT/OAC at home, presenting 3/18/23 after waking with dysarthria and right sided weakness, had diplopia 3 days PTA, /165, CTH no acute changes, chronic bilateral BG infarcts and mod to severe CIWM changes, CTA H/N without LVO, patchy narrowing of right vert and bilateral PCAs suggestive of vasospasm vs non-calcified plaques, and severe stenosis of left vert with densely calcified plaques. MRI brain w/wo with acute infarcts in left thalamus, left frontal, right parietal, right occipital, right hakan, left cerebral peduncle. .4, HgbA1C 5.0. Exam with right F/A/L weakness, dysarthria, o/w non-focal.  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 Continue ASA 81mg daily and Lipitor 80mg daily. Needs TTE with bubbles and may need SHABANA/Cardiac monitoring. Permissive HTN, treat SBP >180. PT/OT/ST/Rehab. Signed: Jignesh Higuera MD

## 2023-03-19 NOTE — PROGRESS NOTES
[address] [facility] Adult  Hospitalist Group                                                                                          Hospitalist Progress Note  Florence Romero MD  Answering service: [phone] from in house phone        Date of Service:  3/19/2023  NAME:  Yenifer Bey  :  1955  MRN:  [id number]       Admission Summary:   Yenifer Bey is a 74 y.o. male who presents with slurred speech and right-sided weakness. Patient's symptoms started this morning, last known well time was around 9 PM last night when he went to bed. Patient presented under code stroke protocol. CT head with perfusion shows no acute pathology. CTA of the head shows no large vessel occlusion, there are patchy areas of vascular narrowing involving the right vertebral artery and left greater than right PCA's which may suggest vasospasm versus noncalcified plaques. Also probable high-grade ostial stenosis of the left vertebral artery. Patient also with pretty elevated blood pressure on presentation with initial blood pressure of 237/165. Patient was evaluated by teleneurology, recommended lowering blood pressure. Patient to be admitted for further evaluation and work-up. Interval history / Subjective:   Patient seen and examined earlier this morning by me for follow-up of acute CVA. Family at bedside. Seen the patient a few times today and discussed results. Patient has no acute complaints at the time of my exam.     Assessment & Plan:     Dysarthria and right-sided weakness secondary to CVA  -Initial CT head with perfusion shows no ischemia  -CTA of the head and neck with concern for vascular narrowing involving the right vertebral artery and PCAs, also high-grade ostial stenosis of the left vertebral artery  -MRI of the brain shows small scattered foci of acute infarction. Inferior left thalamus, pulmonary of the left limits, periaqueductal no cephalic on the right.   Severe chronic microvascular ischemic change, moderate cerebral atrophy, and scattered chronic cerebral infarctions. Likely embolic, but patient also has atherosclerotic disease  Echocardiogram pending, may need SHABANA, may need heart monitor  Neurology on board  Continue aspirin and statin  Blood pressure goal less than 180 for now with permissive hypertension  PT and OT consult  Speech therapy consult     Uncontrolled hypertension  Hypertensive emergency ruled out as patient is not having hypertensive encephalopathy as he has true strokes  Permissive hypertension less than 887 systolic  Continue to monitor  Will need long-term blood pressure control    Medication nonadherence  Patient is apparently been prescribed cholesterol medication and blood pressure medication but has not been taking them reliably  Discussed with patient and family the importance of adherence at this time     Hyponatremia  -Mild hyponatremia, likely hypovolemic versus other etiology  -Repeat labs in a.m. start gentle IV fluid hydration     CHEYANNE  -Patient with normal renal function on recent labs  -Start gentle IV fluid hydration and repeat renal function in a.m.      Code status: Full  Prophylaxis: Lovenox  Care Plan discussed with: Patient, nurse, family, subspecialist  Anticipated Disposition: 48 hours  Inpatient  Cardiac monitoring: Telemetry     Hospital Problems  Never Reviewed            Codes Class Noted POA    CVA (cerebral vascular accident) [facility]) ICD-10-CM: I63.9  ICD-9-CM: 434.91  3/18/2023 Unknown           Social Determinants of Health     Tobacco Use: Not on file   Alcohol Use: Not on file   Financial Resource Strain: Not on file   Food Insecurity: Not on file   Transportation Needs: Not on file   Physical Activity: Not on file   Stress: Not on file   Social Connections: Not on file   Intimate Partner Violence: Not on file   Depression: Not on file   Housing Stability: Not on file       Review of Systems:   A comprehensive review of systems was negative except for that written in the HPI. Vital Signs:    Last 24hrs VS reviewed since prior progress note. Most recent are:  Visit Vitals  BP (!) 189/121 (BP 1 Location: Left upper arm, BP Patient Position: At rest)   Pulse 75   Temp 97.6 °F (36.4 °C)   Resp 15   Ht 5' 10\" (1.778 m)   Wt 77.8 kg (171 lb 8.3 oz)   SpO2 96%   BMI 24.61 kg/m²         Intake/Output Summary (Last 24 hours) at 3/19/2023 1843  Last data filed at 3/19/2023 1829  Gross per 24 hour   Intake --   Output 400 ml   Net -400 ml        Physical Examination:     I had a face to face encounter with this patient and independently examined them on 3/19/2023 as outlined below:          General : alert x 3, awake, no acute distress,   HEENT: PEERL, EOMI, moist mucus membrane  Neck: supple, no JVD, no meningeal signs  Chest: Clear to auscultation bilaterally   CVS: S1 S2 heard, Capillary refill less than 2 seconds  Abd: soft/ non tender, non distended, BS physiological,   Ext: no clubbing, no cyanosis, no edema, brisk 2+ DP pulses  Neuro/Psych: pleasant mood and affect, expressive aphasia, right upper extremity 0/5, right lower extremity 3-4/5, right facial droop  Skin: warm     Data Review:    Review and/or order of clinical lab test  Review and/or order of tests in the radiology section of CPT  Review and/or order of tests in the medicine section of CPT      I have personally and independently reviewed all pertinent labs, diagnostic studies, imaging, and have provided independent interpretation of the same.      Labs:     Recent Labs     03/18/23 1710   WBC 8.9   HGB 14.6   HCT 42.0        Recent Labs     03/19/23  0307 03/18/23 1710   * 135*   K 3.1* 3.3*    100   CO2 26 31   BUN 15 17   CREA 0.96 1.31*   * 87   CA 9.4 9.4     Recent Labs     03/18/23 1710   ALT 28   AP 64   TBILI 0.5   TP 7.6   ALB 4.2   GLOB 3.4     Recent Labs     03/18/23 1710   INR 1.0   PTP 10.4      No results for input(s): FE, TIBC, PSAT, FERR in the last 72 hours. No results found for: FOL, RBCF   No results for input(s): PH, PCO2, PO2 in the last 72 hours. No results for input(s): CPK, CKNDX, TROIQ in the last 72 hours. No lab exists for component: CPKMB  Lab Results   Component Value Date/Time    Cholesterol, total 238 (H) 03/19/2023 03:07 AM    HDL Cholesterol 39 03/19/2023 03:07 AM    LDL, calculated 165.4 (H) 03/19/2023 03:07 AM    Triglyceride 168 (H) 03/19/2023 03:07 AM    CHOL/HDL Ratio 6.1 (H) 03/19/2023 03:07 AM     Lab Results   Component Value Date/Time    Glucose (POC) 81 03/18/2023 04:59 PM     No results found for: COLOR, APPRN, SPGRU, REFSG, TRENA, PROTU, GLUCU, KETU, BILU, UROU, JOSE, LEUKU, GLUKE, EPSU, BACTU, WBCU, RBCU, CASTS, UCRY    Notes reviewed from all clinical/nonclinical/nursing services involved in patient's clinical care. Care coordination discussions were held with appropriate clinical/nonclinical/ nursing providers based on care coordination needs. Patients current active Medications were reviewed, considered, added and adjusted based on the clinical condition today. Home Medications were reconciled to the best of my ability given all available resources at the time of admission. Route is PO if not otherwise noted.       Admission Status:[id number]:::1}      Medications Reviewed:     Current Facility-Administered Medications   Medication Dose Route Frequency    atorvastatin (LIPITOR) tablet 80 mg  80 mg Oral DAILY    labetaloL (NORMODYNE;TRANDATE) injection 10 mg  10 mg IntraVENous Q6H PRN    sodium chloride (NS) flush 5-40 mL  5-40 mL IntraVENous Q8H    sodium chloride (NS) flush 5-40 mL  5-40 mL IntraVENous PRN    acetaminophen (TYLENOL) tablet 650 mg  650 mg Oral Q6H PRN    Or    acetaminophen (TYLENOL) suppository 650 mg  650 mg Rectal Q6H PRN    polyethylene glycol (MIRALAX) packet 17 g  17 g Oral DAILY PRN    ondansetron (ZOFRAN ODT) tablet 4 mg  4 mg Oral Q8H PRN    Or    [facility]ans[facility]ron [facility]) injection 4 mg  4 mg IntraVENous Q6H PRN    enoxaparin (LOVENOX) injection 40 mg  40 mg SubCUTAneous DAILY    0.9% sodium chloride infusion  75 mL/hr IntraVENous CONTINUOUS    aspirin chewable tablet 81 mg  81 mg Oral DAILY     Current Outpatient Medications   Medication Sig    lisinopril-hydroCHLOROthiazide (PRINZIDE, ZESTORETIC) 20-25 mg per tablet Take 1.5 Tablets by mouth daily. metoprolol succinate (TOPROL-XL) 100 mg tablet Take 100 mg by mouth daily. ergocalciferol (ERGOCALCIFEROL) 1,250 mcg (50,000 unit) capsule Take 50,000 Units by mouth every seven (7) days.     rosuvastatin (CRESTOR) 40 mg tablet Take 40 mg by mouth nightly.     ______________________________________________________________________  EXPECTED LENGTH OF STAY: - - -  ACTUAL LENGTH OF STAY:          Robbie [address] Annabelle King MD

## 2023-03-19 NOTE — ED NOTES
Patient had voided in the bed. Patient cleaned and linen changed. Patient denies any other needs at this time.

## 2023-03-20 ENCOUNTER — APPOINTMENT (OUTPATIENT)
Dept: NON INVASIVE DIAGNOSTICS | Age: 68
DRG: 041 | End: 2023-03-20
Attending: FAMILY MEDICINE
Payer: MEDICARE

## 2023-03-20 LAB
ANION GAP SERPL CALC-SCNC: 7 MMOL/L (ref 5–15)
BASOPHILS # BLD: 0 K/UL (ref 0–0.1)
BASOPHILS NFR BLD: 0 % (ref 0–1)
BUN SERPL-MCNC: 12 MG/DL (ref 6–20)
BUN/CREAT SERPL: 10 (ref 12–20)
CALCIUM SERPL-MCNC: 9.4 MG/DL (ref 8.5–10.1)
CHLORIDE SERPL-SCNC: 102 MMOL/L (ref 97–108)
CO2 SERPL-SCNC: 26 MMOL/L (ref 21–32)
COMMENT, HOLDF: NORMAL
CREAT SERPL-MCNC: 1.19 MG/DL (ref 0.7–1.3)
DIFFERENTIAL METHOD BLD: ABNORMAL
ECHO AO ROOT DIAM: 4.7 CM
ECHO AO ROOT INDEX: 2.41 CM/M2
ECHO AV AREA PEAK VELOCITY: 3.8 CM2
ECHO AV AREA/BSA PEAK VELOCITY: 1.9 CM2/M2
ECHO AV PEAK GRADIENT: 4 MMHG
ECHO AV PEAK VELOCITY: 1.1 M/S
ECHO AV VELOCITY RATIO: 1.09
ECHO EST RA PRESSURE: 3 MMHG
ECHO LA DIAMETER INDEX: 1.95 CM/M2
ECHO LA DIAMETER: 3.8 CM
ECHO LA TO AORTIC ROOT RATIO: 0.81
ECHO LV E' LATERAL VELOCITY: 7 CM/S
ECHO LV E' SEPTAL VELOCITY: 5 CM/S
ECHO LV FRACTIONAL SHORTENING: 39 % (ref 28–44)
ECHO LV INTERNAL DIMENSION DIASTOLE INDEX: 2.36 CM/M2
ECHO LV INTERNAL DIMENSION DIASTOLIC: 4.6 CM (ref 4.2–5.9)
ECHO LV INTERNAL DIMENSION SYSTOLIC INDEX: 1.44 CM/M2
ECHO LV INTERNAL DIMENSION SYSTOLIC: 2.8 CM
ECHO LV IVSD: 1.6 CM (ref 0.6–1)
ECHO LV MASS 2D: 256.8 G (ref 88–224)
ECHO LV MASS INDEX 2D: 131.7 G/M2 (ref 49–115)
ECHO LV POSTERIOR WALL DIASTOLIC: 1.2 CM (ref 0.6–1)
ECHO LV RELATIVE WALL THICKNESS RATIO: 0.52
ECHO LVOT AREA: 3.5 CM2
ECHO LVOT DIAM: 2.1 CM
ECHO LVOT PEAK GRADIENT: 5 MMHG
ECHO LVOT PEAK VELOCITY: 1.2 M/S
ECHO MV A VELOCITY: 0.61 M/S
ECHO MV AREA PHT: 2.4 CM2
ECHO MV E DECELERATION TIME (DT): 322.1 MS
ECHO MV E VELOCITY: 0.5 M/S
ECHO MV E/A RATIO: 0.82
ECHO MV E/E' LATERAL: 7.14
ECHO MV E/E' RATIO (AVERAGED): 8.57
ECHO MV E/E' SEPTAL: 10
ECHO MV PRESSURE HALF TIME (PHT): 93.4 MS
ECHO PV MAX VELOCITY: 0.8 M/S
ECHO PV PEAK GRADIENT: 2 MMHG
ECHO RV FREE WALL PEAK S': 17 CM/S
ECHO RV INTERNAL DIMENSION: 2.7 CM
ECHO RV TAPSE: 2.8 CM (ref 1.7–?)
EOSINOPHIL # BLD: 0.1 K/UL (ref 0–0.4)
EOSINOPHIL NFR BLD: 1 % (ref 0–7)
ERYTHROCYTE [DISTWIDTH] IN BLOOD BY AUTOMATED COUNT: 12.4 % (ref 11.5–14.5)
GLUCOSE SERPL-MCNC: 103 MG/DL (ref 65–100)
HCT VFR BLD AUTO: 44.5 % (ref 36.6–50.3)
HGB BLD-MCNC: 15.6 G/DL (ref 12.1–17)
IMM GRANULOCYTES # BLD AUTO: 0 K/UL (ref 0–0.04)
IMM GRANULOCYTES NFR BLD AUTO: 0 % (ref 0–0.5)
LYMPHOCYTES # BLD: 1.1 K/UL (ref 0.8–3.5)
LYMPHOCYTES NFR BLD: 14 % (ref 12–49)
MCH RBC QN AUTO: 29.7 PG (ref 26–34)
MCHC RBC AUTO-ENTMCNC: 35.1 G/DL (ref 30–36.5)
MCV RBC AUTO: 84.8 FL (ref 80–99)
MONOCYTES # BLD: 0.7 K/UL (ref 0–1)
MONOCYTES NFR BLD: 9 % (ref 5–13)
NEUTS SEG # BLD: 6.1 K/UL (ref 1.8–8)
NEUTS SEG NFR BLD: 76 % (ref 32–75)
NRBC # BLD: 0 K/UL (ref 0–0.01)
NRBC BLD-RTO: 0 PER 100 WBC
PLATELET # BLD AUTO: 256 K/UL (ref 150–400)
PMV BLD AUTO: 9.5 FL (ref 8.9–12.9)
POTASSIUM SERPL-SCNC: 2.7 MMOL/L (ref 3.5–5.1)
POTASSIUM SERPL-SCNC: 3.5 MMOL/L (ref 3.5–5.1)
RBC # BLD AUTO: 5.25 M/UL (ref 4.1–5.7)
SAMPLES BEING HELD,HOLD: NORMAL
SODIUM SERPL-SCNC: 135 MMOL/L (ref 136–145)
WBC # BLD AUTO: 8 K/UL (ref 4.1–11.1)

## 2023-03-20 PROCEDURE — 92522 EVALUATE SPEECH PRODUCTION: CPT

## 2023-03-20 PROCEDURE — 74011250637 HC RX REV CODE- 250/637: Performed by: FAMILY MEDICINE

## 2023-03-20 PROCEDURE — 99232 SBSQ HOSP IP/OBS MODERATE 35: CPT | Performed by: NURSE PRACTITIONER

## 2023-03-20 PROCEDURE — 85025 COMPLETE CBC W/AUTO DIFF WBC: CPT

## 2023-03-20 PROCEDURE — 92610 EVALUATE SWALLOWING FUNCTION: CPT

## 2023-03-20 PROCEDURE — 74011000250 HC RX REV CODE- 250: Performed by: FAMILY MEDICINE

## 2023-03-20 PROCEDURE — 36415 COLL VENOUS BLD VENIPUNCTURE: CPT

## 2023-03-20 PROCEDURE — 97530 THERAPEUTIC ACTIVITIES: CPT

## 2023-03-20 PROCEDURE — 80048 BASIC METABOLIC PNL TOTAL CA: CPT

## 2023-03-20 PROCEDURE — 93306 TTE W/DOPPLER COMPLETE: CPT

## 2023-03-20 PROCEDURE — 93306 TTE W/DOPPLER COMPLETE: CPT | Performed by: SPECIALIST

## 2023-03-20 PROCEDURE — 74011250637 HC RX REV CODE- 250/637

## 2023-03-20 PROCEDURE — 74011250636 HC RX REV CODE- 250/636: Performed by: FAMILY MEDICINE

## 2023-03-20 PROCEDURE — 97161 PT EVAL LOW COMPLEX 20 MIN: CPT

## 2023-03-20 PROCEDURE — 74011250637 HC RX REV CODE- 250/637: Performed by: NURSE PRACTITIONER

## 2023-03-20 PROCEDURE — 84132 ASSAY OF SERUM POTASSIUM: CPT

## 2023-03-20 PROCEDURE — 97165 OT EVAL LOW COMPLEX 30 MIN: CPT

## 2023-03-20 PROCEDURE — 65270000046 HC RM TELEMETRY

## 2023-03-20 PROCEDURE — 74011000250 HC RX REV CODE- 250: Performed by: STUDENT IN AN ORGANIZED HEALTH CARE EDUCATION/TRAINING PROGRAM

## 2023-03-20 RX ORDER — LABETALOL HYDROCHLORIDE 5 MG/ML
10 INJECTION, SOLUTION INTRAVENOUS
Status: DISCONTINUED | OUTPATIENT
Start: 2023-03-20 | End: 2023-03-21

## 2023-03-20 RX ORDER — LABETALOL HYDROCHLORIDE 5 MG/ML
10 INJECTION, SOLUTION INTRAVENOUS
Status: DISCONTINUED | OUTPATIENT
Start: 2023-03-20 | End: 2023-03-20 | Stop reason: SDUPTHER

## 2023-03-20 RX ORDER — HYDRALAZINE HYDROCHLORIDE 20 MG/ML
10 INJECTION INTRAMUSCULAR; INTRAVENOUS
Status: DISCONTINUED | OUTPATIENT
Start: 2023-03-20 | End: 2023-03-22 | Stop reason: HOSPADM

## 2023-03-20 RX ORDER — POTASSIUM CHLORIDE 750 MG/1
40 TABLET, FILM COATED, EXTENDED RELEASE ORAL DAILY
Status: DISCONTINUED | OUTPATIENT
Start: 2023-03-21 | End: 2023-03-22 | Stop reason: HOSPADM

## 2023-03-20 RX ORDER — POTASSIUM CHLORIDE 750 MG/1
40 TABLET, FILM COATED, EXTENDED RELEASE ORAL 2 TIMES DAILY
Status: DISCONTINUED | OUTPATIENT
Start: 2023-03-20 | End: 2023-03-20

## 2023-03-20 RX ADMIN — LABETALOL HYDROCHLORIDE 10 MG: 5 INJECTION INTRAVENOUS at 01:31

## 2023-03-20 RX ADMIN — ASPIRIN 81 MG CHEWABLE TABLET 81 MG: 81 TABLET CHEWABLE at 09:08

## 2023-03-20 RX ADMIN — POTASSIUM CHLORIDE 40 MEQ: 750 TABLET, FILM COATED, EXTENDED RELEASE ORAL at 17:54

## 2023-03-20 RX ADMIN — SODIUM CHLORIDE, PRESERVATIVE FREE 10 ML: 5 INJECTION INTRAVENOUS at 21:06

## 2023-03-20 RX ADMIN — SODIUM CHLORIDE, PRESERVATIVE FREE 10 ML: 5 INJECTION INTRAVENOUS at 17:55

## 2023-03-20 RX ADMIN — POTASSIUM CHLORIDE 40 MEQ: 750 TABLET, FILM COATED, EXTENDED RELEASE ORAL at 01:28

## 2023-03-20 RX ADMIN — ENOXAPARIN SODIUM 40 MG: 100 INJECTION SUBCUTANEOUS at 09:08

## 2023-03-20 RX ADMIN — POTASSIUM CHLORIDE 40 MEQ: 750 TABLET, FILM COATED, EXTENDED RELEASE ORAL at 09:08

## 2023-03-20 RX ADMIN — LABETALOL HYDROCHLORIDE 10 MG: 5 INJECTION INTRAVENOUS at 10:16

## 2023-03-20 RX ADMIN — ACETAMINOPHEN 650 MG: 325 TABLET ORAL at 21:05

## 2023-03-20 RX ADMIN — ATORVASTATIN CALCIUM 80 MG: 40 TABLET, FILM COATED ORAL at 09:07

## 2023-03-20 RX ADMIN — LABETALOL HYDROCHLORIDE 10 MG: 5 INJECTION INTRAVENOUS at 17:54

## 2023-03-20 RX ADMIN — SODIUM CHLORIDE 75 ML/HR: 900 INJECTION, SOLUTION INTRAVENOUS at 02:04

## 2023-03-20 NOTE — PROGRESS NOTES
3/20/2023 -   TRANSITIONS OF CARE PLAN:   RUR: 7%; LOW   DISPOSITION: TBD - likely IPR placement in [place], pending choice and ins auth   TRANSPORT: TBD - Possibly Family VS BLS  DME: has cane, BP Cuff, scale  NEEDS FOR DISCHARGE: Ins Auth for IPR placement   BARRIERS: Medical Progression; Choice for IPR in [place]; Ins Auth   ANTICIPATED FOLLOW UPS: PCP, Neuro  ONGOING INPATIENT NEEDS: Brain MRI, PT/OT/SLP Evals, Neuro Consult, Echo, Monitoring of Labs, Further Plan As Per Medical Team  RECEIVED IMM LETTER: Received 1st 3/19       PRIMARY CONTACT:   Cecilia Poser: [phone]    Anticipated Discharge is: Greater Than 48 Hours     Reason for Admission:  CVA                   RUR Score:  7%; LOW                    Plan for utilizing home health:  TBD      PCP: First and Last name:  Ayah Swanson MD Dr. Adela Alba    Name of Practice: [facility]   Are you a current patient: Yes/No: Yes    Approximate date of last visit: 3/16   Can you participate in a virtual visit with your PCP:                     Current Advanced Directive/Advance Care Plan: Full Code    Healthcare Decision Maker:   Click here to complete [address] including selection of the [address] Relationship (ie \"Primary\")           PRIMARY: Manoj Mackey, spouse: [phone]  SECONDARY: Kiran Fermin, daughter in law: [phone]                  Transition of Care Plan:                    CM notes CM Consult for Assistance With Discharge Planning.   CM met with patient, with patient alert and oriented x4 with spouse present at bedside    Patient identified that he has an [employer] ins plan as primary; Medicare Part A is secondary     BCBS: ID [id number]  Medicare: ID [id number]    Patient Registration is aware of the above and is to add the ins to patient's profile    At baseline, patient lives with spouse in a 10th floor apartment, elevator access    Patient and spouse purchased a home in the [place] [facility] as of 3/19:     [address], [address]    2 story home, first floor living, 3 exterior steps     Home DME includes: cane, bp cuff, scale    Patient is independent in ADLs, to include driving and 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 Patient is retired [employer]. Patient has no hx of HH or Rehab    Pharmacy preference is: [facility] in [place] ; will be transitioned to [facility].     Additional support includes: spouse, son Shruthi Pandya in [place], son Sherry Meza in [place], daughter lives in [place]     Patient has been fully vaccinated via the [employer] vaccine, with having received 3 doses     Patient has an AMD that indicates spouse as mPOA1 and daughter in law as mPOA2; CM requested for the spouse to provide a copy to the hospital    CM discussed IPR recommendation with attending team.  Attending is in agreement to the above. CM discussed potential Rehab placement with patient and spouse, who are in agreement. Family is currently reviewing possible facilities in [place], as the patient and spouse have not transitioned to [place] at this time. CM requested for choice by 3/21; family and patient to provide choices to Unit CM. ED CM provided SBAR Handoff to Unit CM by email. Attending Team is aware of the above. Likely disposition is for IPR placement in [place] with likely BLS transport, pending therapy CRYSTAL recommendations and dependent on progression. The program assesses the family and/or caregiver's readiness, willingness, and ability to provide or support and self-management activities for the patient as needed. Care Management Interventions  PCP Verified by CM: Yes  Palliative Care Criteria Met (RRAT>21 & CHF Dx)?: No  Mode of Transport at Discharge:  Other (see comment) (family vs bls)  Transition of Care Consult (CM Consult): Discharge Planning, Acute Rehab  MyChart Signup: No  Discharge Durable Medical Equipment: No  Health Maintenance Reviewed: Yes  Physical Therapy Consult: Yes  Occupational Therapy Consult: Yes  Speech Therapy Consult: Yes  Support Systems: Child(agustin), Spouse/Significant Other, Other Family Member(s)  Confirm Follow Up Transport: Self   Resource Information Provided?: No  Discharge Location  Patient Expects to be Discharged to[de-identified] Rehab hospital/unit acute    CRM: Jessie Markham, MPH, [facility] [address].: [phone] or [phone]

## 2023-03-20 NOTE — PROGRESS NOTES
[address] [facility] Adult  Hospitalist Group                                                                                          Hospitalist Progress Note  Joey Parkinson MD  Answering service: [phone] OR [phone] from in house phone        Date of Service:  3/20/2023  NAME:  Steve Robles  :  1955  MRN:  [id number]       Admission Summary:   Steve Robles is a 74 y.o. male who presents with slurred speech and right-sided weakness. Patient's symptoms started this morning, last known well time was around 9 PM last night when he went to bed. Patient presented under code stroke protocol. CT head with perfusion shows no acute pathology. CTA of the head shows no large vessel occlusion, there are patchy areas of vascular narrowing involving the right vertebral artery and left greater than right PCA's which may suggest vasospasm versus noncalcified plaques. Also probable high-grade ostial stenosis of the left vertebral artery. Patient also with pretty elevated blood pressure on presentation with initial blood pressure of 237/165. Patient was evaluated by teleneurology, recommended lowering blood pressure. Patient to be admitted for further evaluation and work-up. Interval history / Subjective:   Patient seen and examined earlier this morning by me for follow-up of acute CVA. Discussed with patient and family. No acute changes overnight. Monitoring blood pressure. Continue current management. Assessment & Plan:     Dysarthria and right-sided weakness secondary to CVA  -Initial CT head with perfusion shows no ischemia  -CTA of the head and neck with concern for vascular narrowing involving the right vertebral artery and PCAs, also high-grade ostial stenosis of the left vertebral artery  -MRI of the brain shows small scattered foci of acute infarction. Inferior left thalamus, pulmonary of the left limits, periaqueductal no cephalic on the right.   Severe chronic microvascular ischemic change, moderate cerebral atrophy, and scattered chronic cerebral infarctions. Likely embolic, but patient also has atherosclerotic disease  Echocardiogram pending, may need SHABANA, may need heart monitor  Neurology on board  Continue aspirin and statin  BP goal less than 160  Use hydralazine or labetalol     Uncontrolled hypertension  Hypertensive emergency ruled out as patient is not having hypertensive encephalopathy as he has true strokes  Blood pressure goal now less than 160  Continue to monitor  Will need long-term blood pressure control    Medication nonadherence  Patient is apparently been prescribed cholesterol medication and blood pressure medication but has not been taking them reliably  Discussed with patient and family the importance of adherence at this time     Hyponatremia  -Mild hyponatremia, likely hypovolemic versus other etiology  -Repeat labs in a.m. start gentle IV fluid hydration     CHEYANNE  -Patient with normal renal function on recent labs  -Start gentle IV fluid hydration and repeat renal function in a.m.     Hypokalemia  Continue supplementation  Resolved     Code status: Full  Prophylaxis: Lovenox  Care Plan discussed with: Patient, nurse, family, subspecialist  Anticipated Disposition: 24 to 48 hours to inpatient rehab  Inpatient  Cardiac monitoring: Telemetry     Hospital Problems  Never Reviewed            Codes Class Noted POA    CVA (cerebral vascular accident) [facility]) ICD-10-CM: I63.9  ICD-9-CM: 434.91  3/18/2023 Unknown         Social Determinants of Health     Tobacco Use: Not on file   Alcohol Use: Not on file   Financial Resource Strain: Not on file   Food Insecurity: Not on file   Transportation Needs: Not on file   Physical Activity: Not on file   Stress: Not on file   Social Connections: Not on file   Intimate Partner Violence: Not on file   Depression: Not on file   Housing Stability: Not on file       Review of Systems:   A comprehensive review of systems was negative except for that written in the HPI. Vital Signs:    Last 24hrs VS reviewed since prior progress note. Most recent are:  Visit Vitals  BP (!) 174/109 (BP 1 Location: Left upper arm, BP Patient Position: At rest)   Pulse 87   Temp 99.7 °F (37.6 °C)   Resp 19   Ht 5' 10\" (1.778 m)   Wt 77.6 kg (171 lb)   SpO2 100%   BMI 24.54 kg/m²       No intake or output data in the 24 hours ending 03/20/23 1925       Physical Examination:     I had a face to face encounter with this patient and independently examined them on 3/20/2023 as outlined below:          General : alert x 3, awake, no acute distress,   HEENT: PEERL, EOMI, moist mucus membrane  Neck: supple, no JVD, no meningeal signs  Chest: Clear to auscultation bilaterally   CVS: S1 S2 heard, Capillary refill less than 2 seconds  Abd: soft/ non tender, non distended, BS physiological,   Ext: no clubbing, no cyanosis, no edema, brisk 2+ DP pulses  Neuro/Psych: pleasant mood and affect, expressive aphasia, right upper extremity 0/5, right lower extremity 3-4/5, right facial droop  Skin: warm     Data Review:    Review and/or order of clinical lab test  Review and/or order of tests in the radiology section of CPT  Review and/or order of tests in the medicine section of CPT      I have personally and independently reviewed all pertinent labs, diagnostic studies, imaging, and have provided independent interpretation of the same.      Labs:     Recent Labs     03/20/23  0034 03/18/23  1710   WBC 8.0 8.9   HGB 15.6 14.6   HCT 44.5 42.0    271       Recent Labs     03/20/23  1141 03/20/23  0034 03/19/23  0307 03/18/23  1710   NA  --  135* 134* 135*   K 3.5 2.7* 3.1* 3.3*   CL  --  102 103 100   CO2  --  26 26 31   BUN  --  12 15 17   CREA  --  1.19 0.96 1.31*   GLU  --  103* 120* 87   CA  --  9.4 9.4 9.4       Recent Labs     03/18/23  1710   ALT 28   AP 64   TBILI 0.5   TP 7.6   ALB 4.2   GLOB 3.4       Recent Labs     03/18/23  1710   INR 1.0   PTP 10.4 No results for input(s): FE, TIBC, PSAT, FERR in the last 72 hours. No results found for: FOL, RBCF   No results for input(s): PH, PCO2, PO2 in the last 72 hours. No results for input(s): CPK, CKNDX, TROIQ in the last 72 hours. No lab exists for component: CPKMB  Lab Results   Component Value Date/Time    Cholesterol, total 238 (H) 03/19/2023 03:07 AM    HDL Cholesterol 39 03/19/2023 03:07 AM    LDL, calculated 165.4 (H) 03/19/2023 03:07 AM    Triglyceride 168 (H) 03/19/2023 03:07 AM    CHOL/HDL Ratio 6.1 (H) 03/19/2023 03:07 AM     Lab Results   Component Value Date/Time    Glucose (POC) 81 03/18/2023 04:59 PM     No results found for: COLOR, APPRN, SPGRU, REFSG, TRENA, PROTU, GLUCU, KETU, BILU, UROU, JOSE, LEUKU, GLUKE, EPSU, BACTU, WBCU, RBCU, CASTS, UCRY    Notes reviewed from all clinical/nonclinical/nursing services involved in patient's clinical care. Care coordination discussions were held with appropriate clinical/nonclinical/ nursing providers based on care coordination needs. Patients current active Medications were reviewed, considered, added and adjusted based on the clinical condition today. Home Medications were reconciled to the best of my ability given all available resources at the time of admission. Route is PO if not otherwise noted.       Admission Status:[id number]:::1}      Medications Reviewed:     Current Facility-Administered Medications   Medication Dose Route Frequency    potassium chloride SR (KLOR-CON 10) tablet 40 mEq  40 mEq Oral BID    atorvastatin (LIPITOR) tablet 80 mg  80 mg Oral DAILY    labetaloL (NORMODYNE;TRANDATE) injection 10 mg  10 mg IntraVENous Q6H PRN    sodium chloride (NS) flush 5-40 mL  5-40 mL IntraVENous Q8H    sodium chloride (NS) flush 5-40 mL  5-40 mL IntraVENous PRN    acetaminophen (TYLENOL) tablet 650 mg  650 mg Oral Q6H PRN    Or    acetaminophen (TYLENOL) suppository 650 mg  650 mg Rectal Q6H PRN    polyethylene glycol (MIRALAX) packet 17 g  17 g Oral DAILY PRN    ondansetron (ZOFRAN ODT) tablet 4 mg  4 mg Oral Q8H PRN    Or    ondansetron (ZOFRAN) injection 4 mg  4 mg IntraVENous Q6H PRN    enoxaparin (LOVENOX) injection 40 mg  40 mg SubCUTAneous DAILY    0.9% sodium chloride infusion  75 mL/hr IntraVENous CONTINUOUS    aspirin chewable tablet 81 mg  81 mg Oral DAILY     ______________________________________________________________________  EXPECTED LENGTH OF STAY: - - -  ACTUAL LENGTH OF STAY:          2                 Hayley Tyler MD

## 2023-03-20 NOTE — PROGRESS NOTES
Orders received, chart reviewed and patient evaluated by physical therapy. Pending progression with skilled acute physical therapy, recommend:  Therapy 3 hours per day 5-7 days per week    Full evaluation to follow.

## 2023-03-20 NOTE — ED NOTES
Sheets and gown changed. Breakfast provided. Sent MD Aguirre Pals message via perfect serve about diastolic BP.

## 2023-03-20 NOTE — PROGRESS NOTES
Orders received, chart reviewed and patient evaluated by occupational therapy. Pending progression with skilled acute occupational therapy, recommend:  Therapy 3 hours per day 5-7 days per week     Recommend with nursing ADLs with supervision/setup, OOB to chair 3x/day and toileting via bedside, x2 assist, and 1 to 1 supervision/assist for safety with toileting hygiene. Thank you for completing as able in order to maintain patient strength, endurance and independence. Full evaluation to follow.    Sylvain Murphy, SUSAN, OTR/L

## 2023-03-20 NOTE — PROGRESS NOTES
SPEECH LANGUAGE PATHOLOGY BEDSIDE SWALLOW AND SPEECH EVALUATIONS/DISCHARGE  Patient: Carl Francisco [de-identified]74 y.o. male)  Date: 3/20/2023  Primary Diagnosis: CVA (cerebral vascular accident) [facility]) [I63.9]       Precautions:        ASSESSMENT :  Based on the objective data described below, the patient presents with functional oropharyngeal swallow with no difficulties or s/s of aspiration appreciated at bedside with any consistencies. Recommend regular diet/thin liquids with general aspiration precautions. Suspect pt is at baseline swallow function. Pt noted to have mild-moderate dysarthria characterized by hyponasality, articulatory imprecision of labial phonemes, and articulatory weakness noted with lingual phonemes. However, pt able to utilize compensatory strategies and is 100% intelligible. Pt will benefit from continued SLP at next level of care for motor speech. Patient will be discharged from acute SLP services. PLAN :  Recommendations and Planned Interventions:  -- regular diet/ thin liquids  -- general aspiration precautions including completely upright for all PO   -- motor speech treatment at next level of care  -- SLP will sign off     Frequency/Duration: Patient will be discharged from acute SLP services. Discharge Recommendations: Inpatient Rehab     SUBJECTIVE:   Patient stated, \"Thank you. OBJECTIVE:     Past Medical History:   Diagnosis Date    Hypertension    History reviewed. No pertinent surgical history.   Prior Level of Function/Home Situation:   Home Situation  Home Environment: Apartment  # Steps to Enter:  (0, uses elevator)  One/Two Story Residence: One story  Living Alone: No  Support Systems: Child(agustin), Spouse/Significant Other, Other Family Member(s)  Patient Expects to be Discharged to[de-identified] Rehab hospital/unit acute  Current DME Used/Available at Home: Cane, quad  Tub or Shower Type: Tub/Shower combination  Diet prior to admission: regular diet/thin liquids  Current Diet: regular diet/thin liquids   Cognitive and Communication Status:  Neurologic State: Alert  Orientation Level: Oriented X4  Cognition: Follows commands, Appropriate for age attention/concentration  Perception: Cues to attend to right side of body, Cues to maintain midline in sitting, Cues to maintain midline in standing, Verbal, Visual  Perseveration: No perseveration noted  Safety/Judgement: Decreased awareness of environment, Decreased awareness of need for assistance, Decreased awareness of need for safety, Decreased insight into deficits  Swallowing Evaluation:   Oral Assessment:  Oral Assessment  Labial: Decreased rate;Decreased seal  Dentition: Natural  Oral Hygiene: oral mucosa moist and clear of secretions  Lingual: Decreased strength  Velum:  (suspect some velopharyngeal insufficiency)  Mandible: No impairment  P.O. Trials:  Patient Position: upright in bed  Vocal quality prior to P.O.: No impairment  Consistency Presented: Thin liquid; Solid  How Presented: Self-fed/presented;Cup/sip;Spoon;Straw;Successive swallows     Bolus Acceptance: No impairment  Bolus Formation/Control: No impairment     Propulsion: No impairment  Oral Residue: None  Initiation of Swallow: No impairment  Laryngeal Elevation: Functional  Aspiration Signs/Symptoms: None  Pharyngeal Phase Characteristics: No impairment, issues, or problems              Oral Phase Severity: No impairment  Pharyngeal Phase Severity : No impairment    NOMS:   The NOMS functional outcome measure was used to quantify this patient's level of swallowing impairment. Based on the NOMS, the patient was determined to be at level 7 for swallow function       NOMS Swallowing Levels:  Level 1 (CN): NPO  Level 2 (CM): NPO but takes consistency in therapy  Level 3 (CL): Takes less than 50% of nutrition p.o. and continues with nonoral feedings; and/or safe with mod cues; and/or max diet restriction  Level 4 (CK):  Safe swallow but needs mod cues; and/or mod diet restriction; and/or still requires some nonoral feeding/supplements  Level 5 (CJ): Safe swallow with min diet restriction; and/or needs min cues  Level 6 (CI): Independent with p.o.; rare cues; usually self cues; may need to avoid some foods or needs extra time  Level 7 ([address] [place] [address]): Independent for all p.o.  HARLEY. (2003). National Outcomes Measurement System (NOMS): Adult Speech-Language Pathology User's Guide. Speech/Language Evaluation  Motor Speech:  Oral-Motor Structure/Motor Speech  Labial: Decreased rate;Decreased seal  Dentition: Natural  Oral Hygiene: oral mucosa moist and clear of secretions  Lingual: Decreased strength  Velum:  (suspect some velopharyngeal insufficiency)  Mandible: No impairment  Dysarthric Characteristics: Velopharyngeal insufficiency; Imprecise;Blended word boundaries; Decreased breath support  Intelligibility: No impairment  Overall Impairment Severity: Mild-moderate  Compensatory Strategies for Motor Speech: Independently utilizes compensatory strategies        NOMS:   The NOMS functional outcome measure was used to quantify this patient's level of motor speech impairment. Based on the NOMS, the patient was determined to be at level 6 for motor speech function. NOMS Motor Speech:  Level 1 (CN):  100% unintelligible  Level 2 (CM):  Communication partner responsible for message; can do CV or automatic words w/ max cues but rarely intelligible in context  Level 3 (CL): communication partner primarily responsible for message but says CV/automatic words intelligibly; mod cues to say simple words/phrases  Level 4 (CK): In structured conversation with familiar listener can say simple words and phrases.   Mod cues for simple sentences  Level 5 (CJ):  Uses simple sentences for ADLs with familiar and unfamiliar listener; min cues for complex sentences  Level 6 (CI):  Intelligible in ADLs; difficulty in voc/social activites; rare cues for complex message; uses comp strategies  Level 7 ([address] [place] [address]): Intelligible in all activities. May occasionally use compensatory strategies. HARLEY. (2003). National Outcomes Measurement System (NOMS): Adult Speech-Language Pathology User's Guide. Pain:  Pain Scale 1: Numeric (0 - 10)  Pain Intensity 1: 0       After treatment:   Call bell within reach and Nursing notified    COMMUNICATION/EDUCATION:     The patient's plan of care including recommendations, planned interventions, and recommended diet changes were discussed with: Registered nurse. Patient/family have participated as able in goal setting and plan of care.     Thank you for this referral.  ALEC Ta  Time Calculation: 20 mins

## 2023-03-20 NOTE — PROGRESS NOTES
TTE is unremarkable, no cardioembolic source of stroke. Needs outpt cardiac monitor at discharge. Please call if needed.

## 2023-03-20 NOTE — ED NOTES
Assumed care of patient in room 18. Patient in NAD, on monitor. Awaiting admission. Ayush called for hospital bed for comfort.

## 2023-03-20 NOTE — PROGRESS NOTES
Problem: Self Care Deficits Care Plan (Adult)  Goal: *Acute Goals and Plan of Care (Insert Text)  Description: FUNCTIONAL STATUS PRIOR TO ADMISSION: Patient was independent and active without use of DME.    HOME SUPPORT: The patient lived with his wife and did not require assistance with ADL tasks. Of note, pt and wife in [place] to house hunt, pt [place] in [place] [facility] [place] full-time. Occupational Therapy Goals  Initiated 3/20/2023   1. Patient will perform standing grooming routine with minimal assistance within 7 day(s). 2.  Patient will perform upper and lower body bathing with minimal assistance/contact guard assist within 7 day(s). 3.  Patient will perform upper body dressing with minimal assistance within 7 day(s). 4.  Patient will perform toilet transfers with minimal assistance within 7 day(s). 5.  Patient will perform all aspects of toileting with minimal assistance within 7 day(s). 6.  Patient will participate in upper extremity therapeutic exercise/activities with supervision/set-up within 7 day(s). 7.  Patient will utilize energy conservation techniques during functional activities with verbal cues within 7 day(s). 8.  Patient will improve their Fugl Arevalo score by 5 points in prep for ADLs within 7 days. Outcome: Not Met   OCCUPATIONAL THERAPY EVALUATION  Patient: Adelaida Fowler [de-identified]74 y.o. male)  Date: 3/20/2023  Primary Diagnosis: CVA (cerebral vascular accident) [facility]) [I63.9]       Precautions: fall       ASSESSMENT  Based on the objective data described below, the patient presents with impaired sitting/standing balance, RUE strength/AROM/coordination, decreased activity tolerance, dysarthria, and limited insight/safety awareness s/p admission from home following multiple GLF and reports of R sided weakness. MRI indicating \"Small scattered foci of acute infarction. . . Inferior left thalamus, pulvinar of the left thalamus, periaqueductal, mesencephalic on the right.  There is severe chronic microvascular ischemic change, moderate cerebral atrophy  and scattered chronic cerebral infarctions. \"     Pt received semi-supine on ED stretcher, with wife present at bedside, and agreeable to participation in therapy session. Pt with impairment in RUE strength/AROM/coordination with greater deficits present distally towards hand. Sensation and vision intact, though pt reporting visual deficits ~ 2 weeks ago. Functional transfers and bed mobility completed with min to mod A x2. Pt currently requiring multimodal cues for active use of RUE and integration of limb into ADL routines/tasks. Pt repositioned for comfort, maintenance of midline, and bed placed in chair position. Extensive education given to pt and wife regarding BE FAST principles. Recommend d/c to IPR. Current Level of Function Impacting Discharge (ADLs/self-care): up to total A LB ADLs, mod A UB dressing, min to mod A x2 bed mobility and functional transfers in prep for ADLs     Functional Outcome Measure: The patient scored Total A-D  Total A-D (Motor Function): 24/66 on the Fugl-Arevalo Assessment which is indicative of moderate severe impairment in upper extremity functional status. Other factors to consider for discharge: PLOF     Patient will benefit from skilled therapy intervention to address the above noted impairments. PLAN :  Recommendations and Planned Interventions: self care training, functional mobility training, therapeutic exercise, balance training, therapeutic activities, endurance activities, patient education, and home safety training    Frequency/Duration: Patient will be followed by occupational therapy 5 times a week to address goals.     Recommendation for discharge: (in order for the patient to meet his/her long term goals)  Therapy 3 hours per day 5-7 days per week    This discharge recommendation:  Has been made in collaboration with the attending provider and/or case management    IF patient discharges home will need the following DME: TBD       SUBJECTIVE:   Patient stated I hurt my shoulder last night when I fell. . . And I tried to pull myself up.  (pt regarding pain in RUE d/t undocumented fall in the evening of 3/19, RN notified)    OBJECTIVE DATA SUMMARY:   HISTORY:   Past Medical History:   Diagnosis Date    Hypertension    History reviewed. No pertinent surgical history. Expanded or extensive additional review of patient history:     Home Situation  Home Environment: Apartment  # Steps to Enter:  (0, uses elevator)  One/Two Story Residence: One story  Living Alone: No  Support Systems: Spouse/Significant Other  Patient Expects to be Discharged to[de-identified] Rehab hospital/unit acute  Current DME Used/Available at Home: Cane, quad  Tub or Shower Type: Tub/Shower combination    Hand dominance: Right    EXAMINATION OF PERFORMANCE DEFICITS:  Cognitive/Behavioral Status:  Neurologic State: Alert  Orientation Level: Oriented X4  Cognition: Follows commands; Appropriate for age attention/concentration  Perception: Cues to attend to right side of body;Cues to maintain midline in sitting;Cues to maintain midline in standing;Verbal;Visual  Perseveration: No perseveration noted  Safety/Judgement: Decreased awareness of environment;Decreased awareness of need for assistance;Decreased awareness of need for safety;Decreased insight into deficits    Skin: visually intact     Edema: none noted     Hearing:   Auditory  Auditory Impairment: None    Vision/Perceptual:    Tracking: Able to track stimulus in all quadrants w/o difficulty    Saccades: Within Defined Limits    Convergence: Normal (within 6 inches from nose)    Visual Fields:  (WDL)  Diplopia: No    Acuity: Within Defined Limits;Able to read employee name badge without difficulty         Range of Motion:    AROM: Generally decreased, functional (RUE grossly decreased)                         Strength:    Strength: Generally decreased, functional (LUE 5/5, RUE 2/5) Coordination:  Coordination: Generally decreased, functional  Fine Motor Skills-Upper: Right Impaired;Left Intact    Gross Motor Skills-Upper: Right Impaired;Left Intact    Tone & Sensation:    Tone: Abnormal (RUE)  Sensation: Intact                      Balance:  Sitting: Impaired  Sitting - Static: Good (unsupported); Fair (occasional)  Sitting - Dynamic: Fair (occasional)  Standing: Impaired; With support  Standing - Static: Fair;Constant support  Standing - Dynamic : Fair;Constant support    Functional Mobility and Transfers for ADLs:  Bed Mobility:  Supine to Sit: Minimum assistance;Assist x2  Sit to Supine: Moderate assistance;Assist x2  Scooting: Contact guard assistance    Transfers:  Sit to Stand: Minimum assistance;Assist x2  Stand to Sit: Minimum assistance;Assist x2  Bed to Chair: Minimum assistance;Assist x2 (stretcher to hospital bed)    ADL Assessment:  Feeding: Minimum assistance (inferred, using RUE as gross motor stabilizer)    Oral Facial Hygiene/Grooming: Minimum assistance (inferred, seated, using RUE as gross motor stabilizer)    Bathing: Total assistance (inferred, up to total A LB)         Upper Body Dressing: Moderate assistance (seated)    Lower Body Dressing: Total assistance (seated)    Toileting: Total assistance (inferred)                ADL Intervention and task modifications:                           Upper Body [address]: Moderate assistance  Cues: Visual cues provided;Verbal cues provided; Tactile cues provided;Physical assistance    Lower Body Dressing Assistance  Socks:  Total assistance (dependent)         Cognitive Retraining  Safety/Judgement: Decreased awareness of environment;Decreased awareness of need for assistance;Decreased awareness of need for safety;Decreased insight into deficits      Functional Measure:  Fugl-Arevalo Assessment of Motor Recovery after Stroke:        Reflex Activity  Flexors/Biceps/Fingers: Can be elicited  Extensors/Triceps: Can be elicited  Reflex Subtotal: 4    Volitional Movement Within Synergies  Shoulder Retraction: Partial  Shoulder Elevation: Partial  Shoulder Abduction (90 degrees): Partial  Shoulder External Rotation: Partial  Elbow Flexion: Partial  Forearm Supination: Partial  Shoulder Adduction/Internal Rotation: Partial  Elbow Extension: Partial  Forearm Pronation: Partial  Subtotal: 9    Volitional Movement Mixing Synergies  Hand to Lumbar Spine: Partial  Shoulder Flexion (0-90 degrees): Partial  Pronation-Supination: Partial  Subtotal: 3    Volitional Movement With Little or No Synergy  Shoulder Abduction (0-90 degrees): Partial  Shoulder Flexion ( degrees): Partial  Pronation/Supination: Partial  Subtotal : 3    Normal Reflex Activity  Biceps, Triceps, Finger Flexors: Full  Subtotal : 2    Upper Extremity Total   Upper Extremity Total: 21    Wrist  Stability at 15 Degree Dorsiflexion: None  Repeated Dorsiflexion/ Volar Flexion: None  Stability at 15 Degree Dorsiflexion: None  Repeated Dorsiflexion/ Volar Flexion: None  Circumduction: None  Wrist Total: 0    Hand  Mass Flexion: Partial  Mass Extension: Partial  Grasp A: None  Grasp B: None  Grasp C: None  Grasp D: None  Grasp E: None  Hand Total: 2    Coordination/Speed  Tremor: Slight  Dysmetria: Marked  Time: >5s  Coordination/Speed Total : 1    Total A-D  Total A-D (Motor Function): 24/66     This is a reliable/valid measure of arm function after a neurological event. It has established value to characterize functional status and for measuring spontaneous and therapy-induced recovery; tests proximal and distal motor functions. Fugl-Arevalo Assessment - UE scores recorded between five and 30 days post neurologic event can be used to predict UE recovery at six months post neurologic event. Severe = 0-21 points   Moderately Severe = 22-33 points   Moderate = 34-47 points   Mild = 48-66 points  DESTINY Medina, MARISOL Dalton, & CLAU Bolden (1992). Measurement of motor recovery after stroke: Outcome assessment and sample size requirements. Stroke, 23, pp. 8597-5629.   ------------------------------------------------------------------------------------------------------------------------------------------------------------------  MCID:  Stroke:   Romina Colorado et al, 2001; n = 171; mean age 79 (6) years; assessed within 16 (12) days of stroke, Acute Stroke)  FMA Motor Scores from Admission to Discharge   10 point increase in FMA Upper Extremity = 1.5 change in discharge FIM   10 point increase in FMA Lower Extremity = 1.9 change in discharge FIM  MDC:   Stroke:   Rhonda Sanchez et al, 2008, n = 14, mean age = 59.9 (14.6) years, assessed on average 14 (6.5) months post stroke, Chronic Stroke)   FMA = 5.2 points for the Upper Extremity portion of the assessment     Occupational Therapy Evaluation Charge Determination   History Examination Decision-Making   LOW Complexity : Brief history review  LOW Complexity : 1-3 performance deficits relating to physical, cognitive , or psychosocial skils that result in activity limitations and / or participation restrictions  MEDIUM Complexity : Patient may present with comorbidities that affect occupational performnce.  Miniml to moderate modification of tasks or assistance (eg, physical or verbal ) with assesment(s) is necessary to enable patient to complete evaluation       Based on the above components, the patient evaluation is determined to be of the following complexity level: LOW   Pain Rating:  Pt reporting pain in RUE with AROM testing, reporting it is d/t injury during fall of evening of 3/19?, RN made aware     Activity Tolerance:   Fair    After treatment patient left in no apparent distress:    Call bell within reach, Bed / chair alarm activated, Caregiver / family present, and Side rails x 3, bed in chair position     COMMUNICATION/EDUCATION:   The patients plan of care was discussed with: Physical therapist and Registered nurse. Patient was educated regarding his deficit(s) of RUE weakness/AROM, impaired balance as this relates to his diagnosis of CVA. He demonstrated Fair understanding as evidenced by verbal discussion. Patient and/or family was verbally educated on the BE FAST acronym for signs/symptoms of CVA and TIA. BE FAST was written on patient's communication board  for visual education and reinforcement. All questions answered with patient indicating fair understanding. Patient/family have participated as able in goal setting and plan of care. and Patient/family agree to work toward stated goals and plan of care. This patients plan of care is appropriate for delegation to [facility].     Thank you for this referral.  Aundrea Bruner, OTMARIA DE JESUS, OTR/L  Time Calculation: 38 mins

## 2023-03-20 NOTE — PROGRESS NOTES
Problem: Mobility Impaired (Adult and Pediatric)  Goal: *Acute Goals and Plan of Care (Insert Text)  Description: FUNCTIONAL STATUS PRIOR TO ADMISSION: Patient was independent and active without use of DME.    HOME SUPPORT PRIOR TO ADMISSION: The patient lived with his wife. Physical Therapy Goals  Initiated 3/20/2023  1. Patient will move from supine to sit and sit to supine  in bed with modified independence within 7 day(s). 2.  Patient will transfer from bed to chair and chair to bed with supervision/set-up using the least restrictive device within 7 day(s). 3.  Patient will perform sit to stand with supervision/set-up within 7 day(s). 4.  Patient will ambulate with minimal assistance/contact guard assist for 150 feet with the least restrictive device within 7 day(s). 5.  Patient will ascend/descend 3 stairs with handrail(s) with minimal assistance/contact guard assist within 7 day(s). 6.  Patient will improve Viera Balance score by 7 points within 7 days. Outcome: Not Met     PHYSICAL THERAPY EVALUATION- NEURO POPULATION  Patient: Evaristo Woodson [de-identified]74 y.o. male)  Date: 3/20/2023  Primary Diagnosis: CVA (cerebral vascular accident) [facility]) [I63.9]       Precautions: fall         ASSESSMENT  Based on the objective data described below, the patient presents with impaired functional independence s/p admission with CVA. MRI positive for acute infarcts. Currently pt's mobility is limited by HTN, weakness, impaired balance, activity tolerance, coordination, and decreased insight into impairments and fall risk. Received pt resting on the ED plinth with his wife in the room, 's/110's, made RN aware, IV meds given w/ improvement in BP after though remained hypertensive. Pt is tearful throughout this session. Wife is supportive and encouraging though somewhat hypervigilant and appearing to video tape parts of this session despite pt indicating his desire not to be taped.  Pt and his wife report visual deficits over a week ago (resolved) followed by weakness. Pt started to use a quad cane with family assist to ambulate over the weekend. Wife indicates h/o non compliance with BP meds. Extensive time was spent today educating pt and wife in [place], importance of med compliance, and therapy plan. Wife indicates prior knowledge of BEFAST from her experience working in a hospital though did not suspect CVA w/ pt's gradual symptom onset. Today pt is generally Min to Mod A x1 to 2 to mobilize off of the ED plinth and onto a hosp bed. He is unsteady and at risk for falls w/ upright activity. Of note, pt's wife indicates a fall in the ED. Made RN aware of wife's comments. Activity was limited to transfers today and gait and osorio balance assessment deferred d/t HTN. At this time patient is below his functional baseline of independent w/o a device, will likely require rehab when ready for discharge. Acute therapy will follow. Current Level of Function Impacting Discharge (mobility/balance): Min/ Mod A x 1 to 2    Functional Outcome Measure: Osorio Balance Assessment  deferred d/t HTN. Other factors to consider for discharge: Indep PLOF     Patient will benefit from skilled therapy intervention to address the above noted impairments. PLAN :  Recommendations and Planned Interventions: bed mobility training, transfer training, gait training, therapeutic exercises, neuromuscular re-education, patient and family training/education, and therapeutic activities      Frequency/Duration: Patient will be followed by physical therapy:  5 times a week to address goals.     Recommendation for discharge: (in order for the patient to meet his/her long term goals)  Therapy 3 hours per day 5-7 days per week    This discharge recommendation:  A follow-up discussion with the attending provider and/or case management is planned    IF patient discharges home will need the following DME: to be determined (TBD) SUBJECTIVE:   Patient tearful at times     OBJECTIVE DATA SUMMARY:   HISTORY:    Past Medical History:   Diagnosis Date    Hypertension    History reviewed. No pertinent surgical history. Personal factors and/or comorbidities impacting plan of care: HTN (wife reporting non compliance with meds)    Home Situation  Home Environment: Apartment  # Steps to Enter:  (0, uses elevator)  One/Two Story Residence: One story  Living Alone: No  Support Systems: Spouse/Significant Other  Patient Expects to be Discharged to[de-identified] Rehab hospital/unit acute  Current DME Used/Available at Home: Cane, quad  Tub or Shower Type: Tub/Shower combination    EXAMINATION/PRESENTATION/DECISION MAKING:   Critical Behavior:  Neurologic State: Alert  Orientation Level: Oriented X4  Cognition: Follows commands, Appropriate for age attention/concentration  Safety/Judgement: Decreased awareness of environment, Decreased awareness of need for assistance, Decreased awareness of need for safety, Decreased insight into deficits  Hearing: Auditory  Auditory Impairment: None  Skin:  Exposed areas grossly intact  Edema: none noted  Range Of Motion:  AROM: Generally decreased, functional (RUE/ LE grossly decreased)                       Strength:    Strength: Generally decreased, functional (RLE/ RUE)                    Tone & Sensation:   Tone: Abnormal (RUE)              Sensation: Intact               Coordination:  Coordination: Generally decreased, functional  Vision:   Tracking: Able to track stimulus in all quadrants w/o difficulty  Saccades: Within Defined Limits  Convergence: Normal (within 6 inches from nose)  Visual Fields:  (WDL)  Diplopia: No  Acuity: Within Defined Limits;Able to read employee name badge without difficulty  Functional Mobility:  Bed Mobility:  Supine to Sit: Minimum assistance;Assist x2  Sit to Supine:  Moderate assistance;Assist x2  Scooting: Contact guard assistance  Transfers:  Sit to Stand: Minimum assistance;Assist x2  Stand to Sit: Minimum assistance;Assist x2        Bed to Chair: Minimum assistance;Assist x2 (stretcher to hospital bed)              Balance:   Sitting: Impaired  Sitting - Static: Good (unsupported); Fair (occasional)  Sitting - Dynamic: Fair (occasional)  Standing: Impaired; With support  Standing - Static: Fair;Constant support  Standing - Dynamic : Fair;Constant support  Ambulation/Gait Training:  Deferred d/t HTN                                 TherapeuticActivity:   Extensive time spent educating re: BEFAST, activity progression, role of therapy, improvement to date (as compared with impairments noted by pt's wife)    Functional Measure  Viera deferred d/t HTN/ activity limited to stretcher to hosp bed transfer to improve pt comfort         Physical Therapy Evaluation Charge Determination   History Examination Presentation Decision-Making   MEDIUM  Complexity : 1-2 comorbidities / personal factors will impact the outcome/ POC  MEDIUM Complexity : 3 Standardized tests and measures addressing body structure, function, activity limitation and / or participation in recreation  LOW Complexity : Stable, uncomplicated  LOW Complexity : FOTO score of       Based on the above components, the patient evaluation is determined to be of the following complexity level: LOW     Pain Rating:  None indicated    Activity Tolerance:   Fair and HTN      After treatment patient left in no apparent distress:   Supine in bed, Call bell within reach, Caregiver / family present, Side rails x 3, and Alarm pad placed in the bed (unable to locate box), RN aware    COMMUNICATION/EDUCATION:   The patients plan of care was discussed with: Occupational therapist and Registered nurse. Patient was educated regarding his deficit(s) of weakness as this relates to his diagnosis of CVA. He demonstrated Fair understanding. May need reinforcing.     Patient and/or family was verbally educated on the BE FAST acronym for signs/symptoms of CVA and TIA. BE FAST handout was given to pt  for visual education and reinforcement. Opportunity given for questions with none asked. Fall prevention education was provided and the patient/caregiver indicated understanding., Patient/family have participated as able in goal setting and plan of care. , and Patient/family agree to work toward stated goals and plan of care.     Thank you for this referral.  Queenie Kirkland, PT   Time Calculation: 41 mins

## 2023-03-20 NOTE — PROGRESS NOTES
Neurology Progress Note     NAME: Virgil Anaya   :  1955   MRN:  [id number]   DATE:  3/20/2023    Assessment:     Active Problems:    CVA (cerebral vascular accident) [facility]) (3/18/2023)    Patient is a 61year-old male with history of HTN, HLD and CAD s/p stenting who presented on 3/18/23 after waking up with dysarthria and R-sided weakness. He was noted to have /165 on admission. He is non-compliant with meds. CT head showed no acute changes and chronic bilateral BG hypodensities. CTA head/neck showed no LVO with patchy narrowing of R vert and bilateral PCAs and severe stenosis of L vertebral artery with densely calcified plaques. MRI brain s/ and wo contrast showed acute small infarcts to L thalamus, L frontal, R parietal, R occipital, R hakan and L cerebral peduncle. No history of APT/OAC. NIS consulted for vert stenosis and no plan for intervention despite medical management. .4, HgbA1C 5.0  Plan:   1.) Multiple embolic strokes and intracranial atherosclerotic disease   - TTE done and  waiting read  - continue aspirin 81 mg daily  - continue high-intensity statin atorvastatin 80 mg daily for goal LDL <70  - Can allow SBP to be <160 today, with goal <140/80 on discharge  - If TTE negative, will need outpatient cardiac monitoring with 14-day holter monitor or per cardiology recs  - PT/OT, [facility] has no further recommendations. Please contact us with questions. Patient will need to follow-up in the Neurology clinic after discharge. Will follow-up with TTE once available.   Subjective:   No complaints    Objective:   Chart reviewed since last seen    Current Facility-Administered Medications   Medication Dose Route Frequency    potassium chloride SR (KLOR-CON 10) tablet 40 mEq  40 mEq Oral BID    atorvastatin (LIPITOR) tablet 80 mg  80 mg Oral DAILY    labetaloL (NORMODYNE;TRANDATE) injection 10 mg  10 mg IntraVENous Q6H PRN    sodium chloride (NS) flush 5-40 mL  5-40 mL IntraVENous Q8H    sodium chloride (NS) flush 5-40 mL  5-40 mL IntraVENous PRN    acetaminophen (TYLENOL) tablet 650 mg  650 mg Oral Q6H PRN    Or    acetaminophen (TYLENOL) suppository 650 mg  650 mg Rectal Q6H PRN    polyethylene glycol (MIRALAX) packet 17 g  17 g Oral DAILY PRN    ondansetron (ZOFRAN ODT) tablet 4 mg  4 mg Oral Q8H PRN    Or    ondansetron (ZOFRAN) injection 4 mg  4 mg IntraVENous Q6H PRN    enoxaparin (LOVENOX) injection 40 mg  40 mg SubCUTAneous DAILY    0.9% sodium chloride infusion  75 mL/hr IntraVENous CONTINUOUS    aspirin chewable tablet 81 mg  81 mg Oral DAILY     Current Outpatient Medications   Medication Sig    lisinopril-hydroCHLOROthiazide (PRINZIDE, ZESTORETIC) 20-25 mg per tablet Take 1.5 Tablets by mouth daily. metoprolol succinate (TOPROL-XL) 100 mg tablet Take 100 mg by mouth daily. ergocalciferol (ERGOCALCIFEROL) 1,250 mcg (50,000 unit) capsule Take 50,000 Units by mouth every seven (7) days. rosuvastatin (CRESTOR) 40 mg tablet Take 40 mg by mouth nightly. Visit Vitals  BP (!) 168/116   Pulse 74   Temp 97.9 °F (36.6 °C)   Resp 19   Ht 5' 10\" (1.778 m)   Wt 77.6 kg (171 lb)   SpO2 98%   BMI 24.54 kg/m²     Temp (24hrs), Av.9 °F (36.6 °C), Min:97.6 °F (36.4 °C), Max:98 °F (36.7 °C)      No intake/output data recorded.  1901 -  0700  In: -   Out: 400 [Urine:400]      Physical Exam:  General: Well developed well nourished patient in no apparent distress. Cardiac: Regular rate and rhythm with no murmurs. Extremities: 2+ Radial pulses, no cyanosis or edema    Neurological Exam:  Mental Status: A&O x3, mild dysarthria. Attention and fund of knowledge appropriate. Normal recent and remote memory. Follows commands. Cranial Nerves:   VFF, PERRL, EOMI, no nystagmus, no ptosis.  Facial sensation is normal. Mild R facial droop. Palate is midline. Tongue is midline. Hearing is intact bilaterally. Motor:  LUE/LLE 5/5, RUE 4+/5 hg, bi/tri 4+/5, RLE 4/5 HF, 2/5 DF. No tremors   Reflexes:   Deep tendon reflexes 2+ and symmetric. Toes downgoing. Sensory:   Intact to LT and PP   Gait:  Deferred   Cerebellar:  ISSA R-side due to weakness, L FTN/HTS intact          Lab Review   Recent Results (from the past 24 hour(s))   CBC WITH AUTOMATED DIFF    Collection Time: 03/20/23 12:34 AM   Result Value Ref Range    WBC 8.0 4.1 - 11.1 K/uL    RBC 5.25 4. 10 - 5.70 M/uL    HGB 15.6 12.1 - 17.0 g/dL    HCT 44.5 36.6 - 50.3 %    MCV 84.8 80.0 - 99.0 FL    MCH 29.7 26.0 - 34.0 PG    MCHC 35.1 30.0 - 36.5 g/dL    RDW 12.4 11.5 - 14.5 %    PLATELET 202 456 - 644 K/uL    MPV 9.5 8.9 - 12.9 FL    NRBC 0.0 0  WBC    ABSOLUTE NRBC 0.00 0.00 - 0.01 K/uL    NEUTROPHILS 76 (H) 32 - 75 %    LYMPHOCYTES 14 12 - 49 %    MONOCYTES 9 5 - 13 %    EOSINOPHILS 1 0 - 7 %    BASOPHILS 0 0 - 1 %    IMMATURE GRANULOCYTES 0 0.0 - 0.5 %    ABS. NEUTROPHILS 6.1 1.8 - 8.0 K/UL    ABS. LYMPHOCYTES 1.1 0.8 - 3.5 K/UL    ABS. MONOCYTES 0.7 0.0 - 1.0 K/UL    ABS. EOSINOPHILS 0.1 0.0 - 0.4 K/UL    ABS. BASOPHILS 0.0 0.0 - 0.1 K/UL    ABS. IMM.  GRANS. 0.0 0.00 - 0.04 K/UL    DF AUTOMATED     METABOLIC PANEL, BASIC    Collection Time: 03/20/23 12:34 AM   Result Value Ref Range    Sodium 135 (L) 136 - 145 mmol/L    Potassium 2.7 (LL) 3.5 - 5.1 mmol/L    Chloride 102 97 - 108 mmol/L    CO2 26 21 - 32 mmol/L    Anion gap 7 5 - 15 mmol/L    Glucose 103 (H) 65 - 100 mg/dL    BUN 12 6 - 20 MG/DL    Creatinine 1.19 0.70 - 1.30 MG/DL    BUN/Creatinine ratio 10 (L) 12 - 20      eGFR >60 >60 ml/min/1.73m2    Calcium 9.4 8.5 - 10.1 MG/DL   SAMPLES BEING HELD    Collection Time: 03/20/23 12:34 AM   Result Value Ref Range    SAMPLES BEING HELD 1red 1blu     COMMENT        Add-on orders for these samples will be processed based on acceptable specimen integrity and analyte stability, which may vary by analyte. ECHO ADULT COMPLETE    Collection Time: 03/20/23  8:50 AM   Result Value Ref Range    IVSd 1.6 (A) 0.6 - 1.0 cm    LVIDd 4.6 4.2 - 5.9 cm    LVIDs 2.8 cm    LVOT Diameter 2.1 cm    LVPWd 1.2 (A) 0.6 - 1.0 cm    LVOT Peak Gradient 5 mmHg    LVOT Peak Velocity 1.2 m/s    RVIDd 2.7 cm    RV Free Wall Peak S' 17 cm/s    LA Diameter 3.8 cm    Est. RA Pressure 3 mmHg    AV Area by Peak Velocity 3.8 cm2    AV Peak Gradient 4 mmHg    AV Peak Velocity 1.1 m/s    MV A Velocity 0.61 m/s    MV E Wave Deceleration Time 322.1 ms    MV E Velocity 0.50 m/s    LV E' Lateral Velocity 7 cm/s    LV E' Septal Velocity 5 cm/s    MV PHT 93.4 ms    MV Area by PHT 2.4 cm2    PV Peak Gradient 2 mmHg    PV Max Velocity 0.8 m/s    TAPSE 2.8 1.7 cm    Aortic Root 4.7 cm    Fractional Shortening 2D 39 28 - 44 %    LVIDd Index 2.36 cm/m2    LVIDs Index 1.44 cm/m2    LV RWT Ratio 0.52     LV Mass 2D 256.8 (A) 88 - 224 g    LV Mass 2D Index 131.7 (A) 49 - 115 g/m2    MV E/A 0.82     E/E' Ratio (Averaged) 8.57     E/E' Lateral 7.14     E/E' Septal 10.00     LVOT Area 3.5 cm2    LA Size Index 1.95 cm/m2    LA/AO Root Ratio 0.81     Ao Root Index 2.41 cm/m2    AV Velocity Ratio 1.09     RODDY/BSA Peak Velocity 1.9 cm2/m2   POTASSIUM    Collection Time: 03/20/23 11:41 AM   Result Value Ref Range    Potassium 3.5 3.5 - 5.1 mmol/L       Additional comments:  I have reviewed the patient's new clinical lab test results. I have personally reviewed the patient's radiographs. MRI Results (most recent):  Results from [place] encounter on 03/18/23    MRI BRAIN W WO CONT    Narrative  Clinical history: stroke  INDICATION:   stroke    COMPARISON: 3/18/2023  TECHNIQUE: MR examination of the brain includes axial and sagittal T1 , axial  T2, axial FLAIR, axial gradient echo, axial DWI, coronal T1 . Pre and post  contrast axial T1-weighted imaging. Postcontrast T1-weighted imaging coronal  plane.     CONTRAST: ProHance  FINDINGS:  There is a small focus of infarction in the inferior left thalamus anteriorly  and in the occipital lobe on the right. Punctate infarct in the thalamus at the  pulvinar. Tiny focus of acute infarction at the inferior mesencephalic  periaqueductal fourth ventricle anteriorly on the right. There are confluent periventricular and scattered foci of increased T2 signal  intensity demonstrated in the corona radiata and the centrum semiovale. There is  sulcal and ventricular prominence. There are small chronic infarctions in the  periventricular white matter of the right and left frontal lobe, chronic infarct  of the right and left basal ganglia as well. Increased T2 signal intensity focus  in the hakan. Otherwise; There is no abnormal parenchymal enhancement. There is no abnormal meningeal  enhancement demonstrated. The brain architecture is normal. There is no evidence  of midline shift or mass-effect. The ventricles are normal in size, position and  configuration. There are no extra-axial fluid collections. Major intracranial  vascular flow-voids are unremarkable. The orbits are grossly symmetric. Dural  venous sinuses are grossly unremarkable. There is no Chiari or syrinx. Pituitary  and infundibulum grossly unremarkable. Cerebellopontine angles are unremarkable. No skull base mass is identified. Cavernous sinuses are symmetric. The mastoid  air cells and are well pneumatized and clear. Impression  Small scattered foci of acute infarction are demonstrated. Inferior left thalamus, pulvinar of the left thalamus, periaqueductal  mesencephalic on the right. There is no associated hemorrhage, midline shift or mass effect. There is severe chronic microvascular ischemic change, moderate cerebral atrophy  and scattered chronic cerebral infarctions. No intracranial mass, hemorrhage .       Care Plan discussed with:  Patient x   Family x   RN    Care Manager    Consultant/Specialist: KIEL Shafer    I discussed the patient with Dr. Dimitrios Mccracken.

## 2023-03-20 NOTE — ED NOTES
Bedside and Verbal shift change report given to RN Eve Addison (oncoming nurse) by TERRY Aranda (offgoing nurse). Report included the following information SBAR, Kardex, ED Summary, MAR, and Cardiac Rhythm   .

## 2023-03-21 LAB
ANION GAP SERPL CALC-SCNC: 4 MMOL/L (ref 5–15)
BASOPHILS # BLD: 0.1 K/UL (ref 0–0.1)
BASOPHILS NFR BLD: 1 % (ref 0–1)
BUN SERPL-MCNC: 16 MG/DL (ref 6–20)
BUN/CREAT SERPL: 15 (ref 12–20)
CALCIUM SERPL-MCNC: 9.1 MG/DL (ref 8.5–10.1)
CHLORIDE SERPL-SCNC: 112 MMOL/L (ref 97–108)
CO2 SERPL-SCNC: 23 MMOL/L (ref 21–32)
CREAT SERPL-MCNC: 1.06 MG/DL (ref 0.7–1.3)
DIFFERENTIAL METHOD BLD: NORMAL
EOSINOPHIL # BLD: 0.3 K/UL (ref 0–0.4)
EOSINOPHIL NFR BLD: 4 % (ref 0–7)
ERYTHROCYTE [DISTWIDTH] IN BLOOD BY AUTOMATED COUNT: 12.6 % (ref 11.5–14.5)
GLUCOSE SERPL-MCNC: 106 MG/DL (ref 65–100)
HCT VFR BLD AUTO: 42.2 % (ref 36.6–50.3)
HGB BLD-MCNC: 14.5 G/DL (ref 12.1–17)
IMM GRANULOCYTES # BLD AUTO: 0 K/UL (ref 0–0.04)
IMM GRANULOCYTES NFR BLD AUTO: 0 % (ref 0–0.5)
LYMPHOCYTES # BLD: 1.5 K/UL (ref 0.8–3.5)
LYMPHOCYTES NFR BLD: 20 % (ref 12–49)
MCH RBC QN AUTO: 29.7 PG (ref 26–34)
MCHC RBC AUTO-ENTMCNC: 34.4 G/DL (ref 30–36.5)
MCV RBC AUTO: 86.3 FL (ref 80–99)
MONOCYTES # BLD: 0.9 K/UL (ref 0–1)
MONOCYTES NFR BLD: 12 % (ref 5–13)
NEUTS SEG # BLD: 4.9 K/UL (ref 1.8–8)
NEUTS SEG NFR BLD: 63 % (ref 32–75)
NRBC # BLD: 0 K/UL (ref 0–0.01)
NRBC BLD-RTO: 0 PER 100 WBC
PLATELET # BLD AUTO: 250 K/UL (ref 150–400)
PMV BLD AUTO: 9.9 FL (ref 8.9–12.9)
POTASSIUM SERPL-SCNC: 3.9 MMOL/L (ref 3.5–5.1)
RBC # BLD AUTO: 4.89 M/UL (ref 4.1–5.7)
SODIUM SERPL-SCNC: 139 MMOL/L (ref 136–145)
WBC # BLD AUTO: 7.7 K/UL (ref 4.1–11.1)

## 2023-03-21 PROCEDURE — 97112 NEUROMUSCULAR REEDUCATION: CPT

## 2023-03-21 PROCEDURE — 36415 COLL VENOUS BLD VENIPUNCTURE: CPT

## 2023-03-21 PROCEDURE — 77030031139 HC SUT VCRL2 J&J -A: Performed by: INTERNAL MEDICINE

## 2023-03-21 PROCEDURE — 74011000250 HC RX REV CODE- 250: Performed by: STUDENT IN AN ORGANIZED HEALTH CARE EDUCATION/TRAINING PROGRAM

## 2023-03-21 PROCEDURE — C1764 EVENT RECORDER, CARDIAC: HCPCS | Performed by: INTERNAL MEDICINE

## 2023-03-21 PROCEDURE — 77030022704 HC SUT VLOC COVD -B: Performed by: INTERNAL MEDICINE

## 2023-03-21 PROCEDURE — 99222 1ST HOSP IP/OBS MODERATE 55: CPT | Performed by: INTERNAL MEDICINE

## 2023-03-21 PROCEDURE — 74011000250 HC RX REV CODE- 250: Performed by: FAMILY MEDICINE

## 2023-03-21 PROCEDURE — 74011250637 HC RX REV CODE- 250/637: Performed by: FAMILY MEDICINE

## 2023-03-21 PROCEDURE — 0JH632Z INSERTION OF MONITORING DEVICE INTO CHEST SUBCUTANEOUS TISSUE AND FASCIA, PERCUTANEOUS APPROACH: ICD-10-PCS | Performed by: INTERNAL MEDICINE

## 2023-03-21 PROCEDURE — 97535 SELF CARE MNGMENT TRAINING: CPT

## 2023-03-21 PROCEDURE — 33285 INSJ SUBQ CAR RHYTHM MNTR: CPT | Performed by: INTERNAL MEDICINE

## 2023-03-21 PROCEDURE — 74011250637 HC RX REV CODE- 250/637: Performed by: STUDENT IN AN ORGANIZED HEALTH CARE EDUCATION/TRAINING PROGRAM

## 2023-03-21 PROCEDURE — 74011250636 HC RX REV CODE- 250/636: Performed by: FAMILY MEDICINE

## 2023-03-21 PROCEDURE — 65270000046 HC RM TELEMETRY

## 2023-03-21 PROCEDURE — 97530 THERAPEUTIC ACTIVITIES: CPT

## 2023-03-21 PROCEDURE — 74011000250 HC RX REV CODE- 250: Performed by: INTERNAL MEDICINE

## 2023-03-21 PROCEDURE — 74011250636 HC RX REV CODE- 250/636: Performed by: STUDENT IN AN ORGANIZED HEALTH CARE EDUCATION/TRAINING PROGRAM

## 2023-03-21 PROCEDURE — 77030010507 HC ADH SKN DERMBND J&J -B: Performed by: INTERNAL MEDICINE

## 2023-03-21 PROCEDURE — 85025 COMPLETE CBC W/AUTO DIFF WBC: CPT

## 2023-03-21 PROCEDURE — 80048 BASIC METABOLIC PNL TOTAL CA: CPT

## 2023-03-21 PROCEDURE — 74011250637 HC RX REV CODE- 250/637

## 2023-03-21 DEVICE — ICM LNQ22 LINQ II USA
Type: IMPLANTABLE DEVICE | Status: FUNCTIONAL
Brand: LINQ II™

## 2023-03-21 RX ORDER — AMLODIPINE BESYLATE 5 MG/1
5 TABLET ORAL DAILY
Status: DISCONTINUED | OUTPATIENT
Start: 2023-03-22 | End: 2023-03-21

## 2023-03-21 RX ORDER — AMLODIPINE BESYLATE 5 MG/1
10 TABLET ORAL DAILY
Status: DISCONTINUED | OUTPATIENT
Start: 2023-03-22 | End: 2023-03-21

## 2023-03-21 RX ORDER — LISINOPRIL 20 MG/1
20 TABLET ORAL DAILY
Status: DISCONTINUED | OUTPATIENT
Start: 2023-03-21 | End: 2023-03-22 | Stop reason: HOSPADM

## 2023-03-21 RX ORDER — AMLODIPINE BESYLATE 5 MG/1
10 TABLET ORAL DAILY
Status: DISCONTINUED | OUTPATIENT
Start: 2023-03-21 | End: 2023-03-22 | Stop reason: HOSPADM

## 2023-03-21 RX ORDER — LABETALOL HCL 20 MG/4 ML
20 SYRINGE (ML) INTRAVENOUS
Status: DISCONTINUED | OUTPATIENT
Start: 2023-03-21 | End: 2023-03-22 | Stop reason: HOSPADM

## 2023-03-21 RX ORDER — LISINOPRIL 20 MG/1
20 TABLET ORAL DAILY
Status: DISCONTINUED | OUTPATIENT
Start: 2023-03-21 | End: 2023-03-21

## 2023-03-21 RX ORDER — LIDOCAINE HYDROCHLORIDE 10 MG/ML
INJECTION INFILTRATION; PERINEURAL AS NEEDED
Status: DISCONTINUED | OUTPATIENT
Start: 2023-03-21 | End: 2023-03-21 | Stop reason: HOSPADM

## 2023-03-21 RX ADMIN — ASPIRIN 81 MG CHEWABLE TABLET 81 MG: 81 TABLET CHEWABLE at 09:00

## 2023-03-21 RX ADMIN — ENOXAPARIN SODIUM 40 MG: 100 INJECTION SUBCUTANEOUS at 08:58

## 2023-03-21 RX ADMIN — AMLODIPINE BESYLATE 10 MG: 5 TABLET ORAL at 21:24

## 2023-03-21 RX ADMIN — LABETALOL HYDROCHLORIDE 20 MG: 5 INJECTION INTRAVENOUS at 15:48

## 2023-03-21 RX ADMIN — SODIUM CHLORIDE, PRESERVATIVE FREE 10 ML: 5 INJECTION INTRAVENOUS at 21:24

## 2023-03-21 RX ADMIN — LABETALOL HYDROCHLORIDE 10 MG: 5 INJECTION INTRAVENOUS at 05:40

## 2023-03-21 RX ADMIN — SODIUM CHLORIDE, PRESERVATIVE FREE 10 ML: 5 INJECTION INTRAVENOUS at 15:48

## 2023-03-21 RX ADMIN — HYDRALAZINE HYDROCHLORIDE 10 MG: 20 INJECTION INTRAMUSCULAR; INTRAVENOUS at 10:51

## 2023-03-21 RX ADMIN — ACETAMINOPHEN 650 MG: 325 TABLET ORAL at 19:14

## 2023-03-21 RX ADMIN — LABETALOL HYDROCHLORIDE 10 MG: 5 INJECTION INTRAVENOUS at 02:15

## 2023-03-21 RX ADMIN — SODIUM CHLORIDE, PRESERVATIVE FREE 10 ML: 5 INJECTION INTRAVENOUS at 02:16

## 2023-03-21 RX ADMIN — LISINOPRIL 20 MG: 20 TABLET ORAL at 09:46

## 2023-03-21 RX ADMIN — POTASSIUM CHLORIDE 40 MEQ: 750 TABLET, FILM COATED, EXTENDED RELEASE ORAL at 09:00

## 2023-03-21 RX ADMIN — ATORVASTATIN CALCIUM 80 MG: 40 TABLET, FILM COATED ORAL at 09:00

## 2023-03-21 NOTE — PROGRESS NOTES
[facility]- [facility] pending ins auth  Transportation: Likely BLS  POC-wife María Gamble [phone]      CHINO noted that all 3 [facility] facilities have offered this pt a bed. CHINO met with this pt and his sons to inform them of this and to get their first choice. They would like for this pt to go to [facility]. CHINO spoke with Yaquelin Walker ([phone])[facility] to inform her and she is going to start the insurance auth. Will follow.  Venkatesh Cons

## 2023-03-21 NOTE — PROGRESS NOTES
Problem: Mobility Impaired (Adult and Pediatric)  Goal: *Acute Goals and Plan of Care (Insert Text)  Description: FUNCTIONAL STATUS PRIOR TO ADMISSION: Patient was independent and active without use of DME.    HOME SUPPORT PRIOR TO ADMISSION: The patient lived with his wife. Physical Therapy Goals  Initiated 3/20/2023  1. Patient will move from supine to sit and sit to supine  in bed with modified independence within 7 day(s). 2.  Patient will transfer from bed to chair and chair to bed with supervision/set-up using the least restrictive device within 7 day(s). 3.  Patient will perform sit to stand with supervision/set-up within 7 day(s). 4.  Patient will ambulate with minimal assistance/contact guard assist for 150 feet with the least restrictive device within 7 day(s). 5.  Patient will ascend/descend 3 stairs with handrail(s) with minimal assistance/contact guard assist within 7 day(s). 6.  Patient will improve Viera Balance score by 7 points within 7 days. Outcome: Progressing Towards Goal     PHYSICAL THERAPY TREATMENT  Patient: Norma Martinez (21 y.o. male)  Date: 3/21/2023  Diagnosis: CVA (cerebral vascular accident) ([facility] [address] [I63.9] <principal problem not specified>  Procedure(s) (LRB):  LOOP RECORDER INSERT (N/A) Day of Surgery  Precautions:    Chart, physical therapy assessment, plan of care and goals were reviewed. ASSESSMENT  Patient continues with skilled PT services and is progressing towards goals. Patient received in bed and agreeable to treatment. Patient noted to be hypertensive but SBP<160 at rest. Patient agreeable to reach EOB, requiring CGA to min A. Some LOB with dynamic sitting balance, strong L lateral lean and retrograde OB. Patient able to stand with min A, required R knee blocking to prevent knee buckling. Required one step commands for motor planning from bed to chair. Some slight LOB with transition from bed to chair requiring mod A to correct.  Once sitting, BP elevated above parameters. Patient emotional with vidya after being able to stand and transfer. HR up to 130s as he is tearful. After resting, BP obtained again, remained elevated. Alerted RN. Reviewed seated HEP, all needs met. Patient remains an excellent candidate for IPR. Will continue to follow in acute setting. Current Level of Function Impacting Discharge (mobility/balance): CGA to min A for bed mobility, min A for sit to stand, min to mod A x 2 for SPT    Other factors to consider for discharge: high PLOF, strong family support, highly motivated          PLAN :  Patient continues to benefit from skilled intervention to address the above impairments. Continue treatment per established plan of care. to address goals. Recommendation for discharge: (in order for the patient to meet his/her long term goals)  Therapy 3 hours per day 5-7 days per week    This discharge recommendation:  Has been made in collaboration with the attending provider and/or case management    IF patient discharges home will need the following DME: to be determined (TBD)       SUBJECTIVE:   Patient stated I am happy.  Patient tearful after session. OBJECTIVE DATA SUMMARY:   Critical Behavior:  Neurologic State: Alert, Drowsy  Orientation Level: Oriented X4  Cognition: Follows commands  Safety/Judgement: Decreased awareness of environment, Decreased awareness of need for assistance, Decreased awareness of need for safety, Decreased insight into deficits  Functional Mobility Training:  Bed Mobility:  Rolling: Minimum assistance  Supine to Sit: Minimum assistance  Sit to Supine:  (in recliner)  Scooting: Contact guard assistance        Transfers:  Sit to Stand: Minimum assistance  Stand to Sit: Minimum assistance        Bed to Chair: Minimum assistance; Moderate assistance;Assist x2       Balance:  Sitting: Impaired  Sitting - Static: Good (unsupported)  Sitting - Dynamic: Fair (occasional); Poor (constant support)  Standing: Impaired; With support  Standing - Static: Constant support; Fair  Standing - Dynamic : Constant support;Fair;Poor      Therapeutic Exercises:   Reviewed seated HEP     Pain Rating:  No pain reported    Activity Tolerance:   Good, Fair, and requires rest breaks    After treatment patient left in no apparent distress:   Sitting in chair, Call bell within reach, and Bed / chair alarm activated    COMMUNICATION/COLLABORATION:   The patients plan of care was discussed with: Occupational therapist and Registered nurse. Patient was educated regarding His deficit(s) of aphasia, R sided hemiparesis, imbalance as this relates to His diagnosis of CVA. He demonstrated Fair understanding as evidenced by discussion of deficits. Patient and/or family was verbally educated on the BE FAST acronym for signs/symptoms of CVA and TIA. BE FAST was written on patient's communication board  for visual education and reinforcement. All questions answered with patient indicating fair understanding.      Maite Shine, PT   Time Calculation: 23 mins

## 2023-03-21 NOTE — PROCEDURES
Loop Implant    Procedure Date: 03/21/23  Lab Physician: Ana Walker MD    INDICATIONS:  Cryptogenic Stroke    PROCEDURE NARRATIVE  After obtaining informed consent, the central chest was prepped and draped in sterile fashion creating a sterile site just lateral to the left side of the sternum at about the 5th intercostal space. This area was infiltrated with lidocaine with epinephrine for local anesthesia. A 1 cm puncture was made with the provided puncture tool and a track was created with the Reveal insertion tool. The Reveal Linq was deployed subcutaneously and the insertion tool was removed. The skin closed with a single layer of 4-0 Vicryl suture and dermabond was applied on the incision. Gauze and a sterile bio-occlusive dressing was applied over the incision. The procedure was well tolerated and there were no immediate complications.     Device: PeakÂ® [id number]  Serial #: [id number]    CONCLUSIONS  Successful ILR insertion

## 2023-03-21 NOTE — PROGRESS NOTES
Problem: Aspiration - Risk of  Goal: *Absence of aspiration  Outcome: Progressing Towards Goal     Problem: Patient Education: Go to Patient Education Activity  Goal: Patient/Family Education  Outcome: Progressing Towards Goal     Problem: Patient Education: Go to Patient Education Activity  Goal: Patient/Family Education  Outcome: Progressing Towards Goal     Problem: Patient Education: Go to Patient Education Activity  Goal: Patient/Family Education  Outcome: Progressing Towards Goal     Problem: Falls - Risk of  Goal: *Absence of Falls  Description: Document Wilfredo Munoz Fall Risk and appropriate interventions in the flowsheet.   Outcome: Progressing Towards Goal  Note: Fall Risk Interventions:  Mobility Interventions: Patient to call before getting OOB, Bed/chair exit alarm, Assess mobility with egress test, Communicate number of staff needed for ambulation/transfer, OT consult for ADLs, PT Consult for mobility concerns, PT Consult for assist device competence, Strengthening exercises (ROM-active/passive)         Medication Interventions: Assess postural VS orthostatic hypotension, Bed/chair exit alarm, Evaluate medications/consider consulting pharmacy, Patient to call before getting OOB, Teach patient to arise slowly    Elimination Interventions: Bed/chair exit alarm, Call light in reach, Patient to call for help with toileting needs, Stay With Me (per policy), Toileting schedule/hourly rounds    History of Falls Interventions: Bed/chair exit alarm, Consult care management for discharge planning, Door open when patient unattended, Investigate reason for fall, Room close to nurse's station         Problem: Patient Education: Go to Patient Education Activity  Goal: Patient/Family Education  Outcome: Progressing Towards Goal     Problem: Patient Education: Go to Patient Education Activity  Goal: Patient/Family Education  Outcome: Progressing Towards Goal     Problem: TIA/CVA Stroke: 0-24 hours  Goal: Off Pathway (Use only if patient is Off Pathway)  Outcome: Progressing Towards Goal  Goal: Activity/Safety  Outcome: Progressing Towards Goal  Goal: Consults, if ordered  Outcome: Progressing Towards Goal  Goal: Diagnostic Test/Procedures  Outcome: Progressing Towards Goal  Goal: Nutrition/Diet  Outcome: Progressing Towards Goal  Goal: Discharge Planning  Outcome: Progressing Towards Goal  Goal: Medications  Outcome: Progressing Towards Goal  Goal: Respiratory  Outcome: Progressing Towards Goal  Goal: Treatments/Interventions/Procedures  Outcome: Progressing Towards Goal  Goal: Minimize risk of bleeding post-thrombolytic infusion  Outcome: Progressing Towards Goal  Goal: Monitor for complications post-thrombolytic infusion  Outcome: Progressing Towards Goal  Goal: Psychosocial  Outcome: Progressing Towards Goal  Goal: *Hemodynamically stable  Outcome: Progressing Towards Goal  Goal: *Neurologically stable  Description: Absence of additional neurological deficits    Outcome: Progressing Towards Goal  Goal: *Verbalizes anxiety and depression are reduced or absent  Outcome: Progressing Towards Goal  Goal: *Absence of Signs of Aspiration on Current Diet  Outcome: Progressing Towards Goal  Goal: *Absence of deep venous thrombosis signs and symptoms(Stroke Metric)  Outcome: Progressing Towards Goal  Goal: *Ability to perform ADLs and demonstrates progressive mobility and function  Outcome: Progressing Towards Goal  Goal: *Stroke education started(Stroke Metric)  Outcome: Progressing Towards Goal  Goal: *Dysphagia screen performed(Stroke Metric)  Outcome: Progressing Towards Goal  Goal: *Rehab consulted(Stroke Metric)  Outcome: Progressing Towards Goal     Problem: TIA/CVA Stroke: Day 2 Until Discharge  Goal: Off Pathway (Use only if patient is Off Pathway)  Outcome: Progressing Towards Goal  Goal: Activity/Safety  Outcome: Progressing Towards Goal  Goal: Diagnostic Test/Procedures  Outcome: Progressing Towards Goal  Goal: Nutrition/Diet  Outcome: Progressing Towards Goal  Goal: Discharge Planning  Outcome: Progressing Towards Goal  Goal: Medications  Outcome: Progressing Towards Goal  Goal: Respiratory  Outcome: Progressing Towards Goal  Goal: Treatments/Interventions/Procedures  Outcome: Progressing Towards Goal  Goal: Psychosocial  Outcome: Progressing Towards Goal  Goal: *Verbalizes anxiety and depression are reduced or absent  Outcome: Progressing Towards Goal  Goal: *Absence of aspiration  Outcome: Progressing Towards Goal  Goal: *Absence of deep venous thrombosis signs and symptoms(Stroke Metric)  Outcome: Progressing Towards Goal  Goal: *Optimal pain control at patient's stated goal  Outcome: Progressing Towards Goal  Goal: *Tolerating diet  Outcome: Progressing Towards Goal  Goal: *Ability to perform ADLs and demonstrates progressive mobility and function  Outcome: Progressing Towards Goal  Goal: *Stroke education continued(Stroke Metric)  Outcome: Progressing Towards Goal     Problem: Ischemic Stroke: Discharge Outcomes  Goal: *Verbalizes anxiety and depression are reduced or absent  Outcome: Progressing Towards Goal  Goal: *Verbalize understanding of risk factor modification(Stroke Metric)  Outcome: Progressing Towards Goal  Goal: *Hemodynamically stable  Outcome: Progressing Towards Goal  Goal: *Absence of aspiration pneumonia  Outcome: Progressing Towards Goal  Goal: *Aware of needed dietary changes  Outcome: Progressing Towards Goal  Goal: *Verbalize understanding of prescribed medications including anti-coagulants, anti-lipid, and/or anti-platelets(Stroke Metric)  Outcome: Progressing Towards Goal  Goal: *Tolerating diet  Outcome: Progressing Towards Goal  Goal: *Aware of follow-up diagnostics related to anticoagulants  Outcome: Progressing Towards Goal  Goal: *Ability to perform ADLs and demonstrates progressive mobility and function  Outcome: Progressing Towards Goal  Goal: *Absence of DVT(Stroke Metric)  Outcome: Progressing Towards Goal  Goal: *Absence of aspiration  Outcome: Progressing Towards Goal  Goal: *Optimal pain control at patient's stated goal  Outcome: Progressing Towards Goal  Goal: *Home safety concerns addressed  Outcome: Progressing Towards Goal  Goal: *Describes available resources and support systems  Outcome: Progressing Towards Goal  Goal: *Verbalizes understanding of activation of EMS(911) for stroke symptoms(Stroke Metric)  Outcome: Progressing Towards Goal  Goal: *Understands and describes signs and symptoms to report to providers(Stroke Metric)  Outcome: Progressing Towards Goal  Goal: *Neurolgocially stable (absence of additional neurological deficits)  Outcome: Progressing Towards Goal  Goal: *Verbalizes importance of follow-up with primary care physician(Stroke Metric)  Outcome: Progressing Towards Goal  Goal: *Smoking cessation discussed,if applicable(Stroke Metric)  Outcome: Progressing Towards Goal  Goal: *Depression screening completed(Stroke Metric)  Outcome: Progressing Towards Goal

## 2023-03-21 NOTE — DISCHARGE INSTRUCTIONS
Loop Recorder (Linq) Discharge Instructions      Please make sure you have received your Temporary Loop Recorder identification card with your discharge instructions      MEDICATIONS        Take only the medications prescribed to you at discharge. ACTIVITY        Return to your normal activity, except as noted below. Avoid tight clothes or unnecessary pressure over your incision (such as bra straps or seat belts). If it is tender or sensitive to clothing, cover the incision with a soft dressing or pad. Questions about driving are individualized and should be discussed with one of the EP Physicians prior to discharge. SHOWERING        Leave the bandage over your after the Loop Recorder implant. You bandage will be removed in clinic in 7-14 days at your follow up appointment. It is important to keep the bandaged area clean and dry. You may shower around the site until the bandage is removed in clinic. Thereafter, you may shower after the bandage is removed, washing it gently with soap and water. Do not apply any lotions, powders, or perfumes to the incision line. Avoid submerging your incision in water (tub baths, hot tubs, or swimming) for two weeks. DISCHARGE PRECAUTIONS        Record your temperature every day, at the same time, until your follow up. A temperature of 100.5 F, or higher, can be the first sign of infection. This should be reported to your Doctor immediately. Always tell your doctor or dentist that you have a Loop Recorder. In some cases, antibiotics may be prescribed before certain procedures. Your temporary identification will be given to you with these instructions. Keep your device card in your wallet or on your person at all times. You should receive your permanent card, although this may take up to 8-12 weeks.   If you do not receive your permanent card, please call the office at [phone] or the phone number provided on your temporary card for the [employer] recorder company. SYMPTOMS THAT NEED TO BE REPORTED IMMEDIATELY        Temperature more than 100.4 F    Redness or warmth at the incision site, or pain for longer than the first 5 days after the implant. Drainage from the incision site. Swelling around the incision site. REMEMBER: If you feel something is an emergency or cannot be handled over the phone, call 911 or go to the closest emergency room.       Job Rice MD  Cardiac Electrophysiology / Cardiology    [address], [address] [facility], [address]  [place]  [phone] / [phone] Fax       [phone] / [phone] Fax

## 2023-03-21 NOTE — PROGRESS NOTES
[address] [facility]  Hospitalist Group                                                                                          Hospitalist Progress Note  Paige Ambriz MD  Answering service: [phone] from in house phone        Date of Service:  3/21/2023  NAME:  Alexandria Leyva  :  1955  MRN:  [id number]       Admission Summary:   Alexandria Leyva is a 74 y.o. male who presents with slurred speech and right-sided weakness. Patient's symptoms started this morning, last known well time was around 9 PM last night when he went to bed. Patient presented under code stroke protocol. CT head with perfusion shows no acute pathology. CTA of the head shows no large vessel occlusion, there are patchy areas of vascular narrowing involving the right vertebral artery and left greater than right PCA's which may suggest vasospasm versus noncalcified plaques. Also probable high-grade ostial stenosis of the left vertebral artery. Patient also with pretty elevated blood pressure on presentation with initial blood pressure of 237/165. Patient was evaluated by teleneurology, recommended lowering blood pressure. Patient to be admitted for further evaluation and work-up. Interval history / Subjective:   Patient seen and examined earlier this morning by me for follow-up of acute CVA. No acute events overnight. Patient looks a lot better today, more awake, speaking better. Family at bedside. Assessment & Plan:     Dysarthria and right-sided weakness secondary to CVA  -Initial CT head with perfusion shows no ischemia  -CTA of the head and neck with concern for vascular narrowing involving the right vertebral artery and PCAs, also high-grade ostial stenosis of the left vertebral artery  -MRI of the brain shows small scattered foci of acute infarction. Inferior left thalamus, pulmonary of the left limits, periaqueductal no cephalic on the right.   Severe chronic microvascular ischemic change, moderate cerebral atrophy, and scattered chronic cerebral infarctions.   Likely embolic, but patient also has atherosclerotic disease  Echocardiogram looks okay, no shunt  Neurology on board  Continue aspirin and statin  BP goal less than 160  Use hydralazine and labetalol  Implantable loop recorder placed today     Uncontrolled hypertension  Hypertensive emergency ruled out as patient is not having hypertensive encephalopathy as he has true strokes  Blood pressure goal now less than 160  Continue to monitor  Will need long-term blood pressure control  Continues to be difficult to control  Lisinopril started today  Amlodipine ordered for tomorrow    Medication nonadherence  Patient is apparently been prescribed cholesterol medication and blood pressure medication but has not been taking them reliably  Discussed with patient and family the importance of adherence at this time     Hyponatremia  -Mild hyponatremia, likely hypovolemic versus other etiology  -Repeat labs in a.m. start gentle IV fluid hydration     CHEYANNE  -Normal    Hypokalemia  Continue supplementation  Resolved     Code status: Full  Prophylaxis: Lovenox  Care Plan discussed with: Patient, nurse, family, subspecialist  Anticipated Disposition: 24 to 48 hours to inpatient rehab  Inpatient  Cardiac monitoring: Telemetry     Hospital Problems  Never Reviewed            Codes Class Noted POA    CVA (cerebral vascular accident) [facility]) ICD-10-CM: I63.9  ICD-9-CM: 434.91  3/18/2023 Unknown         Social Determinants of Health     Tobacco Use: Not on file   Alcohol Use: Not on file   Financial Resource Strain: Not on file   Food Insecurity: Not on file   Transportation Needs: Not on file   Physical Activity: Not on file   Stress: Not on file   Social Connections: Not on file   Intimate Partner Violence: Not on file   Depression: Not on file   Housing Stability: Not on file       Review of Systems:   A comprehensive review of systems was negative except for that written in the HPI. Vital Signs:    Last 24hrs VS reviewed since prior progress note. Most recent are:  Visit Vitals  BP (!) 164/107   Pulse 71   Temp 97.8 °F (36.6 °C)   Resp 17   Ht 5' 10\" (1.778 m)   Wt 77.5 kg (170 lb 13.7 oz)   SpO2 98%   BMI 24.52 kg/m²         Intake/Output Summary (Last 24 hours) at 3/21/2023 1914  Last data filed at 3/20/2023 2200  Gross per 24 hour   Intake 200 ml   Output --   Net 200 ml          Physical Examination:     I had a face to face encounter with this patient and independently examined them on 3/21/2023 as outlined below:          General : alert x 3, awake, no acute distress,   HEENT: PEERL, EOMI, moist mucus membrane  Neck: supple, no JVD, no meningeal signs  Chest: Clear to auscultation bilaterally   CVS: S1 S2 heard, Capillary refill less than 2 seconds  Abd: soft/ non tender, non distended, BS physiological,   Ext: no clubbing, no cyanosis, no edema, brisk 2+ DP pulses  Neuro/Psych: pleasant mood and affect, expressive aphasia, right upper extremity 0/5, right lower extremity 3-4/5, right facial droop  Skin: warm     Data Review:    Review and/or order of clinical lab test  Review and/or order of tests in the radiology section of CPT  Review and/or order of tests in the medicine section of CPT      I have personally and independently reviewed all pertinent labs, diagnostic studies, imaging, and have provided independent interpretation of the same. Labs:     Recent Labs     03/21/23 0210 03/20/23  0034   WBC 7.7 8.0   HGB 14.5 15.6   HCT 42.2 44.5    256       Recent Labs     03/21/23  0210 03/20/23  1141 03/20/23  0034 03/19/23  0307     --  135* 134*   K 3.9 3.5 2.7* 3.1*   *  --  102 103   CO2 23  --  26 26   BUN 16  --  12 15   CREA 1.06  --  1.19 0.96   *  --  103* 120*   CA 9.1  --  9.4 9.4       No results for input(s): ALT, AP, TBIL, TBILI, TP, ALB, GLOB, GGT, AML, LPSE in the last 72 hours.     No lab exists for component: SGOT, GPT, AMYP, HLPSE    No results for input(s): INR, PTP, APTT, INREXT, INREXT in the last 72 hours. No results for input(s): FE, TIBC, PSAT, FERR in the last 72 hours. No results found for: FOL, RBCF   No results for input(s): PH, PCO2, PO2 in the last 72 hours. No results for input(s): CPK, CKNDX, TROIQ in the last 72 hours. No lab exists for component: CPKMB  Lab Results   Component Value Date/Time    Cholesterol, total 238 (H) 03/19/2023 03:07 AM    HDL Cholesterol 39 03/19/2023 03:07 AM    LDL, calculated 165.4 (H) 03/19/2023 03:07 AM    Triglyceride 168 (H) 03/19/2023 03:07 AM    CHOL/HDL Ratio 6.1 (H) 03/19/2023 03:07 AM     Lab Results   Component Value Date/Time    Glucose (POC) 81 03/18/2023 04:59 PM     No results found for: COLOR, APPRN, SPGRU, REFSG, TRENA, PROTU, GLUCU, KETU, BILU, UROU, JOSE, LEUKU, GLUKE, EPSU, BACTU, WBCU, RBCU, CASTS, UCRY    Notes reviewed from all clinical/nonclinical/nursing services involved in patient's clinical care. Care coordination discussions were held with appropriate clinical/nonclinical/ nursing providers based on care coordination needs. Patients current active Medications were reviewed, considered, added and adjusted based on the clinical condition today. Home Medications were reconciled to the best of my ability given all available resources at the time of admission. Route is PO if not otherwise noted.       Admission Status:[id number]:::1}      Medications Reviewed:     Current Facility-Administered Medications   Medication Dose Route Frequency    lisinopriL (PRINIVIL, ZESTRIL) tablet 20 mg  20 mg Oral DAILY    labetaloL (NORMODYNE;TRANDATE) 20 mg/4 mL (5 mg/mL) injection 20 mg  20 mg IntraVENous Q6H PRN    [START ON 3/22/2023] amLODIPine (NORVASC) tablet 5 mg  5 mg Oral DAILY    hydrALAZINE (APRESOLINE) 20 mg/mL injection 10 mg  10 mg IntraVENous Q6H PRN    potassium chloride SR (KLOR-CON 10) tablet 40 mEq  40 mEq Oral DAILY atorvastatin (LIPITOR) tablet 80 mg  80 mg Oral DAILY    sodium chloride (NS) flush 5-40 mL  5-40 mL IntraVENous Q8H    sodium chloride (NS) flush 5-40 mL  5-40 mL IntraVENous PRN    acetaminophen (TYLENOL) tablet 650 mg  650 mg Oral Q6H PRN    Or    acetaminophen (TYLENOL) suppository 650 mg  650 mg Rectal Q6H PRN    polyethylene glycol (MIRALAX) packet 17 g  17 g Oral DAILY PRN    ondansetron (ZOFRAN ODT) tablet 4 mg  4 mg Oral Q8H PRN    Or    ondansetron (ZOFRAN) injection 4 mg  4 mg IntraVENous Q6H PRN    enoxaparin (LOVENOX) injection 40 mg  40 mg SubCUTAneous DAILY    0.9% sodium chloride infusion  75 mL/hr IntraVENous CONTINUOUS    aspirin chewable tablet 81 mg  81 mg Oral DAILY     ______________________________________________________________________  EXPECTED LENGTH OF STAY: 2d 21h  ACTUAL LENGTH OF STAY:          3                 Oneida Mensah MD

## 2023-03-21 NOTE — CONSULTS
[facility] CARDIOLOGY    KATHYA Addendum    I have seen, interviewed, and examined the patient. I agree with the history, exam, and was involved in the formulation of the assessment and plan as detailed in the Cardiology/Cardiac Electrophysiology consultation documentation composed today by the Advance Practice Provider (KATHYA, NP, PA). Please see the KATHYAs note for full details, below includes any additional revisions. I have performed the exam and medical decision making portions in its entirety. Physical exam:  Visit Vitals  BP (!) 175/104   Pulse 77   Temp 98.5 °F (36.9 °C)   Resp 23   Ht 5' 10\" (1.778 m)   Wt 170 lb 13.7 oz (77.5 kg)   SpO2 98%   BMI 24.52 kg/m²       GENERAL: No acute distress  HEAD: AT/NC  HEENT: Sclerae anicteric  CARDIOVASCULAR: Regular rate and rhythm, normal S1/S2, no murmurs/rubs/gallops  RESPIRATORY: clear to auscultation bilaterally, without wheezes/rales/rhonchi  ABDOMINAL: soft, non-tender, non-distended, positive bowel sounds  EXTREMITIES: warm, well perfused, no lower extremities edema, no cyanosis. NEURO: alert, oriented, answering questions appropriately, facial droop, right-sided weakness, slurred speech   PSY: normal mood    Data: Labs, ECG, telemetry personally reviewed and interpreted    Current active problems:   1. cryptogenic CVA  2. Hypertension  3. Medication noncompliance  4. Acute kidney injury    Assessment and Plan:   Cardiac electrophysiology was consulted regarding the management of cryptogenic stroke and consideration for implantable loop recorder implantation. Patient presented on [phone] with slurred speech and right-sided weakness. Underwent code stroke protocol with CT demonstrating patchy areas of vascular narrowing involving the right cerebral artery. Head MRI demonstrated small scattered foci of acute infarction involving the inferior left thalamic, severe chronic microvascular ischemic changes and scattered chronic cerebral infarction.   Work-up thus far included echocardiogram without PFO. Telemetry demonstrated no evidence of atrial fibrillation.  - In the setting of presentation concerning for cardioembolic ischemia without identifiable etiology, patient would benefit from an implantable loop recorder to assess for occult atrial fibrillation to help guide long-term anticoagulation management  - Post CVA management per neurology  - The implications, benefits, risks associated with loop recorder implantation was explained  - Proceed with loop implantation today  - We will arrange for site check in 7 to 10 days  - We will follow-up with EP in 6 months  - EP will signoff at this time      Thank you so much for the consultation. Please don't hesitate to contact us with any questions or referrals. ___________________________    An Susy Hernández MD, [facility], [address] [place] [address]., [address] [place] [phone]   / [facility] [address] [place] [address]., [address]  Marii Gale [address] [place] [address] CARDIOLOGY                       Electrophysiology Consult Note     [x]Initial Consult     []Progress note    Patient Name: Roland Gardner - OFJ:[id number] - DLX:[id number]  Primary Cardiologist: [facility] Card[facility] Physicians: none  Consulting Cardiologist: [facility] Dutton Cardiology Physicians: Susy Hernández MD     Reason for consult: consideration for ILR with cryptogenic stroke. HPI:       Roland Gardner is a 74 y.o. male  who presented 3/18  with slurred speech and right-sided weakness. Patient's symptoms started this morning, last known well time was around 9 PM the night before when he went to bed. Patient presented under code stroke protocol. CT head with perfusion shows no acute pathology.   CTA of the head shows no large vessel occlusion, there are patchy areas of vascular narrowing involving the right vertebral artery and left greater than right PCA's which may suggest vasospasm versus noncalcified plaques. Also probable high-grade ostial stenosis of the left vertebral artery. Patient also with pretty elevated blood pressure on presentation with initial blood pressure of 237/165. Patient was evaluated by teleneurology, recommended lowering blood pressure. SUBJECTIVE:      Narciso Graham reports a hx of rapid HR at times however no  chest pain/ shortness of breath, orthopnea, PND, LE edema,  syncope, presyncope or fatigue. Currently without complaints awaiting in patient rehab. Will be moving to [place] in the near future. .     Assessment and Plan     CVA - TTE without PFO. MRI with small scattered foci of acute infarction. Inferior left thalamus, pulmonary of the left limits, periaqueductal no cephalic on the right. Severe chronic microvascular ischemic change, moderate cerebral atrophy, and scattered chronic cerebral infarctions. I believe an implanted loop monitor would be of benefit to the patient as implanted loop increased detection of PAF from 2 to 12% over standard medical monitoring in cryptogenic CVA at 1 year (SHELLEY LORENZ, [facility] 2014). I discussed the risks/benefits/alternatives of the procedure with the patient. Risks include (but are not limited to) bleeding, heart block, infection, cva/mi/tamponade/death. The patient understands and agrees to proceed. HTN - Per Neurology: Can allow SBP to be <160 today, with goal <140/80 on discharge  Medication non-adherence. Aware of need for better medication compliance and HTN management. CHEYANNE       ____________________________________________________________    Cardiac testing        Most recent HS troponins:  Recent Labs     03/18/23  1710   TROPHS 7       ECG:   Normal sinus rhythm   Left ventricular hypertrophy with QRS widening and repolarization abnormality  QTc 487.     Review of Systems:    [x]All other systems reviewed and all negative except as written in HPI    [] Patient unable to provide secondary to condition    Past Medical History:   Diagnosis Date    Hypertension      History reviewed. No pertinent surgical history. Social Hx:    Family Hx: family history is not on file. No Known Allergies       OBJECTIVE:  Wt Readings from Last 3 Encounters:   03/20/23 170 lb 13.7 oz (77.5 kg)       Intake/Output Summary (Last 24 hours) at 3/21/2023 1210  Last data filed at 3/20/2023 2200  Gross per 24 hour   Intake 560 ml   Output --   Net 560 ml       Physical Exam:    Vitals:   Vitals:    03/21/23 0708 03/21/23 0946 03/21/23 1000 03/21/23 1038   BP: (!) 161/119 (!) 172/120  (!) 175/104   Pulse:  74 77 74   Resp:    23   Temp:    98.5 °F (36.9 °C)   SpO2:       Weight:       Height:         Telemetry: normal sinus rhythm    Gen: Well-developed, well-nourished, in no acute distress  Neck: Supple, No JVD, No Carotid Bruit  Resp: No accessory muscle use, Clear breath sounds, No rales or rhonchi  Card: Regular Rate,Rythm, Normal S1, S2, No murmurs, rubs or gallop. No thrills. Abd:   Soft, non-tender, non-distended, BS+   MSK: No cyanosis  Skin: No rashes    Neuro: residual right sided weakness,  follows commands appropriately  Psych: Good insight, oriented to person, place, alert, Nml Affect  LE: No edema    Data Review:     03/18/23    ECHO ADULT COMPLETE 03/20/2023 3/20/2023    Interpretation Summary    Left Ventricle: Normal left ventricular systolic function with a visually estimated EF of 55 - 60%. Left ventricle size is normal. Normal wall thickness. Normal wall motion. Normal diastolic function. Aortic Valve: Valve structure is normal. Moderately thickened cusp. Moderately calcified cusp.     Signed by: Emre Duncan MD on 3/20/2023  4:32 PM      MRI Results (most recent):  Results from Hospital Encounter encounter on 03/18/23    MRI BRAIN W WO CONT    Narrative  Clinical history: stroke  INDICATION:   stroke    COMPARISON: 3/18/2023  TECHNIQUE: MR examination of the brain includes axial and sagittal T1 , axial  T2, axial FLAIR, axial gradient echo, axial DWI, coronal T1 . Pre and post  contrast axial T1-weighted imaging. Postcontrast T1-weighted imaging coronal  plane. CONTRAST: ProHance  FINDINGS:  There is a small focus of infarction in the inferior left thalamus anteriorly  and in the occipital lobe on the right. Punctate infarct in the thalamus at the  pulvinar. Tiny focus of acute infarction at the inferior mesencephalic  periaqueductal fourth ventricle anteriorly on the right. There are confluent periventricular and scattered foci of increased T2 signal  intensity demonstrated in the corona radiata and the centrum semiovale. There is  sulcal and ventricular prominence. There are small chronic infarctions in the  periventricular white matter of the right and left frontal lobe, chronic infarct  of the right and left basal ganglia as well. Increased T2 signal intensity focus  in the hakan. Otherwise; There is no abnormal parenchymal enhancement. There is no abnormal meningeal  enhancement demonstrated. The brain architecture is normal. There is no evidence  of midline shift or mass-effect. The ventricles are normal in size, position and  configuration. There are no extra-axial fluid collections. Major intracranial  vascular flow-voids are unremarkable. The orbits are grossly symmetric. Dural  venous sinuses are grossly unremarkable. There is no Chiari or syrinx. Pituitary  and infundibulum grossly unremarkable. Cerebellopontine angles are unremarkable. No skull base mass is identified. Cavernous sinuses are symmetric. The mastoid  air cells and are well pneumatized and clear. Impression  Small scattered foci of acute infarction are demonstrated. Inferior left thalamus, pulvinar of the left thalamus, periaqueductal  mesencephalic on the right. There is no associated hemorrhage, midline shift or mass effect.     There is severe chronic microvascular ischemic change, moderate cerebral atrophy  and scattered chronic cerebral infarctions. No intracranial mass, hemorrhage . Radiology:   XR Results (most recent):  Results from Hospital Encounter encounter on 03/18/23    XR CHEST PORT    Narrative  EXAM:  XR CHEST PORT    INDICATION: CVA    COMPARISON: none    TECHNIQUE: Upright portable chest AP view    FINDINGS: Cardiac monitoring leads. The cardiac silhouette is within normal  limits. The pulmonary vasculature is within normal limits. The lungs and pleural spaces are clear. The visualized bones and upper abdomen  are age-appropriate.     Impression  No acute process on portable chest.        Recent Labs     03/21/23  0210 03/20/23  1141 03/20/23  0034     --  135*   K 3.9 3.5 2.7*   *  --  102   CO2 23  --  26   BUN 16  --  12   CREA 1.06  --  1.19   *  --  103*   CA 9.1  --  9.4     Recent Labs     03/21/23  0210 03/20/23  0034   WBC 7.7 8.0   HGB 14.5 15.6   HCT 42.2 44.5    256     Recent Labs     03/18/23  1710   PTP 10.4   INR 1.0   AP 64     Recent Labs     03/19/23  0307   CHOL 238*   LDLC 165.4*         Current meds:    Current Facility-Administered Medications:     lisinopriL (PRINIVIL, ZESTRIL) tablet 20 mg, 20 mg, Oral, DAILY, Fleming Diones, Heddie Dance, MD, 20 mg at 03/21/23 0946    labetaloL (NORMODYNE;TRANDATE) injection 10 mg, 10 mg, IntraVENous, Q6H PRN, 10 mg at 03/21/23 0540 **OR** hydrALAZINE (APRESOLINE) 20 mg/mL injection 10 mg, 10 mg, IntraVENous, Q6H PRN, Fleming Diones, Heddie Dance, MD, 10 mg at 03/21/23 1051    potassium chloride SR (KLOR-CON 10) tablet 40 mEq, 40 mEq, Oral, DAILY, Fleming Diones, Heddie Dance, MD, 40 mEq at 03/21/23 0900    atorvastatin (LIPITOR) tablet 80 mg, 80 mg, Oral, DAILY, Shania [place] NP, 80 mg at 03/21/23 0900    sodium chloride (NS) flush 5-40 mL, 5-40 mL, IntraVENous, Q8H, Gopal Farrell MD, 10 mL at 03/21/23 0216    sodium chloride (NS) flush 5-40 mL, 5-40 mL, IntraVENous, PRN, Lalitha MALNOE MD, 10 mL at 03/20/23 1755    acetaminophen (TYLENOL) tablet 650 mg, 650 mg, Oral, Q6H PRN, 650 mg at 03/20/23 2105 **OR** acetaminophen (TYLENOL) suppository 650 mg, 650 mg, Rectal, Q6H PRN, Gopal Farrell MD    polyethylene glycol (MIRALAX) packet 17 g, 17 g, Oral, DAILY PRN, Gopal Farrell MD    ondansetron (ZOFRAN ODT) tablet 4 mg, 4 mg, Oral, Q8H PRN **OR** ondansetron (ZOFRAN) injection 4 mg, 4 mg, IntraVENous, Q6H PRN, Gopal Farrell MD    enoxaparin (LOVENOX) injection 40 mg, 40 mg, SubCUTAneous, DAILY, Gopal Farrell MD, 40 mg at 03/21/23 0858    0.9% sodium chloride infusion, 75 mL/hr, IntraVENous, CONTINUOUS, Gopal Farrell MD, Last Rate: 75 mL/hr at 03/20/23 0204, 75 mL/hr at 03/20/23 0204    aspirin chewable tablet 81 mg, 81 mg, Oral, DAILY, Shania [place] NP, 81 mg at 03/21/23 0900         The patient is a candidate for ILR. I believe an implanted loop monitor would be of benefit to the patient as implanted loop increased detection of PAF from 2 to 12% over standard medical monitoring in cryptogenic CVA at 1 year ([facility]J 2014). I discussed the risks/benefits/alternatives of the procedure with the patient. Risks include (but are not limited to) bleeding, infection, cva/mi/death. The patient understands and would like to proceed. [facility] Cardiology  Call center: (u) [phone]      CC: Ayah Swanson MD

## 2023-03-21 NOTE — PROGRESS NOTES
Problem: Self Care Deficits Care Plan (Adult)  Goal: *Acute Goals and Plan of Care (Insert Text)  Description: FUNCTIONAL STATUS PRIOR TO ADMISSION: Patient was independent and active without use of DME.    HOME SUPPORT: The patient lived with his wife and did not require assistance with ADL tasks. Of note, pt and wife in [place] to house hunt, pt lives in [facility] full-time. Occupational Therapy Goals  Initiated 3/20/2023   1. Patient will perform standing grooming routine with minimal assistance within 7 day(s). 2.  Patient will perform upper and lower body bathing with minimal assistance/contact guard assist within 7 day(s). 3.  Patient will perform upper body dressing with minimal assistance within 7 day(s). 4.  Patient will perform toilet transfers with minimal assistance within 7 day(s). 5.  Patient will perform all aspects of toileting with minimal assistance within 7 day(s). 6.  Patient will participate in upper extremity therapeutic exercise/activities with supervision/set-up within 7 day(s). 7.  Patient will utilize energy conservation techniques during functional activities with verbal cues within 7 day(s). 8.  Patient will improve their Fugl Arevalo score by 5 points in prep for ADLs within 7 days. Outcome: Progressing Towards Goal   OCCUPATIONAL THERAPY TREATMENT  Patient: Kim Bansal [de-identified]74 y.o. male)  Date: 3/21/2023  Diagnosis: [facility] [I63.9] <principal problem not specified>  Procedure(s) (LRB):  LOOP RECORDER INSERT (N/A) Day of Surgery  Precautions:    Chart, occupational therapy assessment, plan of care, and goals were reviewed.     ASSESSMENT  Patient continues with skilled OT services and is progressing towards goals however remains limited by decreased sitting<standing balance, RUE>RLE function, coordination (FM>GM), attention to & initiation of his dominant R side, motor planning, activity tolerance, distal lower body access, emotional lability, and cognition (attention, complex command following, initiation, sequencing, processing, safety, insight, problem solving) with lower body dressing, functional transfers to the chair, and seated grooming tasks completed this session. He continues to require Min-Mod A x2 for OOB mobility with BUE HHA, decreased RLE coordination with mod cues for safety & sequencing. He continues with diminished RUE function (FM>GM), requiring Min-Max A for seated ADLs with mod cues for LUE AAROM to facilitate increased independence. BP elevated above parameters upon sitting in the chair, therefore further standing tasks deferred, RN alerted. All needs met seated in the chair, continue to recommend d/c to IPR as he remains below his IND PLOF. Current Level of Function Impacting Discharge (ADLs): Min-Max A for ADLs, Min-Mod A x1-2 for OOB mobility     Other factors to consider for discharge: fall risk, assist of 2, PMH, PLOF, acute neuro deficits          PLAN :  Patient continues to benefit from skilled intervention to address the above impairments. Continue treatment per established plan of care to address goals. Recommend with staff: Recommend with nursing, ADLs with assist, OOB to chair 3x/day via 2 assist, and toileting via  [facility] with 2 assist . Thank you for completing as able in order to maintain patient strength, endurance and independence. Recommendation for discharge: (in order for the patient to meet his/her long term goals)  Therapy 3 hours per day 5-7 days per week    This discharge recommendation:  Has been made in collaboration with the attending provider and/or case management    IF patient discharges home will need the following DME: To be determined (TBD)         SUBJECTIVE:   Patient stated I just can't stop crying.     OBJECTIVE DATA SUMMARY:   Cognitive/Behavioral Status:  Neurologic State: Alert  Orientation Level: Oriented X4  Cognition: Decreased attention/concentration; Follows commands; Impaired decision making; Impulsive;Poor safety awareness  Perception: Cues to attend to right side of body;Cues to maintain midline in sitting;Cues to maintain midline in standing  Perseveration: No perseveration noted  Safety/Judgement: Awareness of environment;Decreased awareness of need for assistance;Decreased awareness of need for safety;Decreased insight into deficits    Functional Mobility and Transfers for ADLs:  Bed Mobility:  Rolling: Minimum assistance  Supine to Sit: Minimum assistance  Sit to Supine:  (in recliner)  Scooting: Contact guard assistance    Transfers:  Sit to Stand: Minimum assistance;Assist x2     Bed to Chair: Minimum assistance; Moderate assistance;Assist x2    Balance:  Sitting: Impaired  Sitting - Static: Good (unsupported)  Sitting - Dynamic: Fair (occasional); Poor (constant support)  Standing: Impaired; With support (BUE HHA)  Standing - Static: Constant support; Fair  Standing - Dynamic : Constant support;Fair;Poor    ADL Intervention:       Grooming  Grooming Assistance: Minimum assistance  Position Performed: Seated in chair  Washing Face: Minimum assistance; Compensatory technique training;Training to use affected extremity as a fine motor assistance;Training to use affected extremity as a gross motor assistance  Cues: Physical assistance; Tactile cues provided;Visual/perceptual training/retraining;Visual cues provided;Verbal cues provided      Lower Body Dressing Assistance  Dressing Assistance: Maximum assistance  Socks: Maximum assistance;Training to use affected extremity as a fine motor assistance;Training to use affected extremity as a gross motor assistance  Leg Crossed Method Used: Yes  Position Performed: Seated edge of bed;Standing  Cues: Physical assistance; Tactile cues provided;Verbal cues provided;Visual cues provided;Visual/perceptual training/retraining    Toileting  Bladder Hygiene:  (aguirre)    Cognitive Retraining  Orientation Retraining: Awareness of environment  Problem Solving: Awareness of environment  Executive Functions: Executing cognitive plans  Organizing/Sequencing: Breaking task down  Attention to Task: Single task  Following Commands: Follows one step commands/directions; Follows two step commands/directions (decreased 2 step commands)  Safety/Judgement: Awareness of environment;Decreased awareness of need for assistance;Decreased awareness of need for safety;Decreased insight into deficits  Cues: Tactile cues provided;Verbal cues provided;Visual cues provided    Neuro Re-Education:  - RUE AROM/AAROM with seated upper & lower body ADLs, decreased FM coordination & attention to/initiation of the RUE  - Visual scanning & attention to the R with min cues, good carryover with visual acuity however decreased with R shailesh body  - Sitting & standing balance with midline righting with up to Min A with dynamic EOB ADLs, increased to Min-Mod A with BUE HHA with standing    Pain:  None    Activity Tolerance:   Good, BP limiting d/t HTN above SBP <160 parameters    After treatment patient left in no apparent distress:   Sitting in chair, Call bell within reach, and Bed / chair alarm activated    COMMUNICATION/COLLABORATION:   The patients plan of care was discussed with: Physical therapist and Registered nurse. Patient was educated regarding His deficit(s) above as this relates to His diagnosis of CVA. He demonstrated Good understanding as evidenced by verbal discussion & carryover. Patient and/or family was verbally educated on the BE FAST acronym for signs/symptoms of CVA and TIA. BE FAST was written on patient's communication board  for visual education and reinforcement. All questions answered with patient indicating good understanding.      SUSAN Velazquez, OTR/L  Time Calculation: 23 mins

## 2023-03-21 NOTE — PROGRESS NOTES
CRYSTAL- Pending [facility], [facility] IPR and [address] [place] [address] IPR. CM noted that therapy is recommending IPR rehab for this pt. With the attending's permission, CM met with this pt and his wife to discuss IPR and to offer choice. They would like referrals to be sent to [facility], [facility] and [facility]. They do not have a preference. CM sent referrals to all of those IPR's via [address] [place] [address],[facility].  Daniel Herbert

## 2023-03-22 VITALS
HEIGHT: 70 IN | SYSTOLIC BLOOD PRESSURE: 167 MMHG | WEIGHT: 170.86 LBS | DIASTOLIC BLOOD PRESSURE: 114 MMHG | BODY MASS INDEX: 24.46 KG/M2 | TEMPERATURE: 99 F | OXYGEN SATURATION: 96 % | HEART RATE: 88 BPM | RESPIRATION RATE: 15 BRPM

## 2023-03-22 LAB
ANION GAP SERPL CALC-SCNC: 9 MMOL/L (ref 5–15)
B PERT DNA SPEC QL NAA+PROBE: NOT DETECTED
BASOPHILS # BLD: 0 K/UL (ref 0–0.1)
BASOPHILS NFR BLD: 1 % (ref 0–1)
BORDETELLA PARAPERTUSSIS PCR, BORPAR: NOT DETECTED
BUN SERPL-MCNC: 14 MG/DL (ref 6–20)
BUN/CREAT SERPL: 15 (ref 12–20)
C PNEUM DNA SPEC QL NAA+PROBE: NOT DETECTED
CALCIUM SERPL-MCNC: 9 MG/DL (ref 8.5–10.1)
CHLORIDE SERPL-SCNC: 107 MMOL/L (ref 97–108)
CO2 SERPL-SCNC: 21 MMOL/L (ref 21–32)
CREAT SERPL-MCNC: 0.91 MG/DL (ref 0.7–1.3)
DIFFERENTIAL METHOD BLD: ABNORMAL
EOSINOPHIL # BLD: 0.3 K/UL (ref 0–0.4)
EOSINOPHIL NFR BLD: 4 % (ref 0–7)
ERYTHROCYTE [DISTWIDTH] IN BLOOD BY AUTOMATED COUNT: 12.5 % (ref 11.5–14.5)
FLUAV SUBTYP SPEC NAA+PROBE: NOT DETECTED
FLUBV RNA SPEC QL NAA+PROBE: NOT DETECTED
GLUCOSE SERPL-MCNC: 104 MG/DL (ref 65–100)
HADV DNA SPEC QL NAA+PROBE: NOT DETECTED
HCOV 229E RNA SPEC QL NAA+PROBE: NOT DETECTED
HCOV HKU1 RNA SPEC QL NAA+PROBE: NOT DETECTED
HCOV NL63 RNA SPEC QL NAA+PROBE: NOT DETECTED
HCOV OC43 RNA SPEC QL NAA+PROBE: NOT DETECTED
HCT VFR BLD AUTO: 43.8 % (ref 36.6–50.3)
HGB BLD-MCNC: 14.6 G/DL (ref 12.1–17)
HMPV RNA SPEC QL NAA+PROBE: NOT DETECTED
HPIV1 RNA SPEC QL NAA+PROBE: NOT DETECTED
HPIV2 RNA SPEC QL NAA+PROBE: NOT DETECTED
HPIV3 RNA SPEC QL NAA+PROBE: NOT DETECTED
HPIV4 RNA SPEC QL NAA+PROBE: NOT DETECTED
IMM GRANULOCYTES # BLD AUTO: 0 K/UL (ref 0–0.04)
IMM GRANULOCYTES NFR BLD AUTO: 1 % (ref 0–0.5)
LYMPHOCYTES # BLD: 1.4 K/UL (ref 0.8–3.5)
LYMPHOCYTES NFR BLD: 18 % (ref 12–49)
M PNEUMO DNA SPEC QL NAA+PROBE: NOT DETECTED
MCH RBC QN AUTO: 29.6 PG (ref 26–34)
MCHC RBC AUTO-ENTMCNC: 33.3 G/DL (ref 30–36.5)
MCV RBC AUTO: 88.8 FL (ref 80–99)
MONOCYTES # BLD: 0.8 K/UL (ref 0–1)
MONOCYTES NFR BLD: 10 % (ref 5–13)
NEUTS SEG # BLD: 5.3 K/UL (ref 1.8–8)
NEUTS SEG NFR BLD: 66 % (ref 32–75)
NRBC # BLD: 0 K/UL (ref 0–0.01)
NRBC BLD-RTO: 0 PER 100 WBC
PLATELET # BLD AUTO: 253 K/UL (ref 150–400)
PMV BLD AUTO: 10.2 FL (ref 8.9–12.9)
POTASSIUM SERPL-SCNC: 4 MMOL/L (ref 3.5–5.1)
RBC # BLD AUTO: 4.93 M/UL (ref 4.1–5.7)
RSV RNA SPEC QL NAA+PROBE: NOT DETECTED
RV+EV RNA SPEC QL NAA+PROBE: NOT DETECTED
SARS-COV-2 RNA RESP QL NAA+PROBE: NOT DETECTED
SODIUM SERPL-SCNC: 137 MMOL/L (ref 136–145)
WBC # BLD AUTO: 7.9 K/UL (ref 4.1–11.1)

## 2023-03-22 PROCEDURE — 85025 COMPLETE CBC W/AUTO DIFF WBC: CPT

## 2023-03-22 PROCEDURE — 74011250637 HC RX REV CODE- 250/637: Performed by: STUDENT IN AN ORGANIZED HEALTH CARE EDUCATION/TRAINING PROGRAM

## 2023-03-22 PROCEDURE — 0202U NFCT DS 22 TRGT SARS-COV-2: CPT

## 2023-03-22 PROCEDURE — 74011000250 HC RX REV CODE- 250: Performed by: STUDENT IN AN ORGANIZED HEALTH CARE EDUCATION/TRAINING PROGRAM

## 2023-03-22 PROCEDURE — 74011250636 HC RX REV CODE- 250/636: Performed by: FAMILY MEDICINE

## 2023-03-22 PROCEDURE — 36415 COLL VENOUS BLD VENIPUNCTURE: CPT

## 2023-03-22 PROCEDURE — 80048 BASIC METABOLIC PNL TOTAL CA: CPT

## 2023-03-22 PROCEDURE — 74011250637 HC RX REV CODE- 250/637

## 2023-03-22 RX ORDER — AMLODIPINE BESYLATE 10 MG/1
10 TABLET ORAL DAILY
Qty: 30 TABLET | Refills: 0 | Status: SHIPPED
Start: 2023-03-22

## 2023-03-22 RX ORDER — GUAIFENESIN 100 MG/5ML
81 LIQUID (ML) ORAL DAILY
Qty: 30 TABLET | Refills: 0 | Status: SHIPPED
Start: 2023-03-23

## 2023-03-22 RX ORDER — LISINOPRIL 40 MG/1
40 TABLET ORAL DAILY
Qty: 30 TABLET | Refills: 0 | Status: SHIPPED
Start: 2023-03-23

## 2023-03-22 RX ORDER — ATORVASTATIN CALCIUM 80 MG/1
80 TABLET, FILM COATED ORAL DAILY
Qty: 30 TABLET | Refills: 0 | Status: SHIPPED
Start: 2023-03-23

## 2023-03-22 RX ADMIN — ATORVASTATIN CALCIUM 80 MG: 40 TABLET, FILM COATED ORAL at 09:07

## 2023-03-22 RX ADMIN — POTASSIUM CHLORIDE 40 MEQ: 750 TABLET, FILM COATED, EXTENDED RELEASE ORAL at 09:07

## 2023-03-22 RX ADMIN — LISINOPRIL 20 MG: 20 TABLET ORAL at 09:07

## 2023-03-22 RX ADMIN — LABETALOL HYDROCHLORIDE 20 MG: 5 INJECTION INTRAVENOUS at 18:23

## 2023-03-22 RX ADMIN — AMLODIPINE BESYLATE 10 MG: 5 TABLET ORAL at 18:19

## 2023-03-22 RX ADMIN — ENOXAPARIN SODIUM 40 MG: 100 INJECTION SUBCUTANEOUS at 09:05

## 2023-03-22 RX ADMIN — ASPIRIN 81 MG CHEWABLE TABLET 81 MG: 81 TABLET CHEWABLE at 09:06

## 2023-03-22 RX ADMIN — LABETALOL HYDROCHLORIDE 20 MG: 5 INJECTION INTRAVENOUS at 04:18

## 2023-03-22 NOTE — PROGRESS NOTES
Problem: Falls - Risk of  Goal: *Absence of Falls  Description: Document Wilfredo Munoz Fall Risk and appropriate interventions in the flowsheet.   Outcome: Progressing Towards Goal  Note: Fall Risk Interventions:  Mobility Interventions: Patient to call before getting OOB, Bed/chair exit alarm, Assess mobility with egress test, Communicate number of staff needed for ambulation/transfer, OT consult for ADLs, PT Consult for mobility concerns, PT Consult for assist device competence, Strengthening exercises (ROM-active/passive)         Medication Interventions: Assess postural VS orthostatic hypotension, Bed/chair exit alarm, Evaluate medications/consider consulting pharmacy, Patient to call before getting OOB, Teach patient to arise slowly    Elimination Interventions: Bed/chair exit alarm, Call light in reach, Patient to call for help with toileting needs, Stay With Me (per policy), Toileting schedule/hourly rounds    History of Falls Interventions: Bed/chair exit alarm, Consult care management for discharge planning, Door open when patient unattended, Investigate reason for fall, Room close to nurse's station         Problem: TIA/CVA Stroke: Day 2 Until Discharge  Goal: Nutrition/Diet  Outcome: Progressing Towards Goal  Goal: Discharge Planning  Outcome: Progressing Towards Goal  Goal: Medications  Outcome: Progressing Towards Goal

## 2023-03-22 NOTE — PROGRESS NOTES
Problem: Aspiration - Risk of  Goal: *Absence of aspiration  Outcome: Resolved/Met     Problem: Patient Education: Go to Patient Education Activity  Goal: Patient/Family Education  Outcome: Resolved/Met     Problem: Patient Education: Go to Patient Education Activity  Goal: Patient/Family Education  Outcome: Resolved/Met     Problem: Patient Education: Go to Patient Education Activity  Goal: Patient/Family Education  Outcome: Resolved/Met     Problem: Falls - Risk of  Goal: *Absence of Falls  Description: Document Mahesh Fall Risk and appropriate interventions in the flowsheet.   Outcome: Resolved/Met  Note: Fall Risk Interventions:  Mobility Interventions: Patient to call before getting OOB, Bed/chair exit alarm, Assess mobility with egress test, Communicate number of staff needed for ambulation/transfer, OT consult for ADLs, PT Consult for mobility concerns, PT Consult for assist device competence, Strengthening exercises (ROM-active/passive)         Medication Interventions: Assess postural VS orthostatic hypotension, Bed/chair exit alarm, Evaluate medications/consider consulting pharmacy, Patient to call before getting OOB, Teach patient to arise slowly    Elimination Interventions: Bed/chair exit alarm, Call light in reach, Patient to call for help with toileting needs, Stay With Me (per policy), Toileting schedule/hourly rounds    History of Falls Interventions: Bed/chair exit alarm, Consult care management for discharge planning, Door open when patient unattended, Investigate reason for fall, Room close to nurse's station         Problem: Patient Education: Go to Patient Education Activity  Goal: Patient/Family Education  Outcome: Resolved/Met     Problem: Patient Education: Go to Patient Education Activity  Goal: Patient/Family Education  Outcome: Resolved/Met     Problem: TIA/CVA Stroke: 0-24 hours  Goal: Off Pathway (Use only if patient is Off Pathway)  Outcome: Resolved/Met  Goal: Activity/Safety  Outcome: Resolved/Met  Goal: Consults, if ordered  Outcome: Resolved/Met  Goal: Diagnostic Test/Procedures  Outcome: Resolved/Met  Goal: Nutrition/Diet  Outcome: Resolved/Met  Goal: Discharge Planning  Outcome: Resolved/Met  Goal: Medications  Outcome: Resolved/Met  Goal: Respiratory  Outcome: Resolved/Met  Goal: Treatments/Interventions/Procedures  Outcome: Resolved/Met  Goal: Minimize risk of bleeding post-thrombolytic infusion  Outcome: Resolved/Met  Goal: Monitor for complications post-thrombolytic infusion  Outcome: Resolved/Met  Goal: Psychosocial  Outcome: Resolved/Met  Goal: *Hemodynamically stable  Outcome: Resolved/Met  Goal: *Neurologically stable  Description: Absence of additional neurological deficits    Outcome: Resolved/Met  Goal: *Verbalizes anxiety and depression are reduced or absent  Outcome: Resolved/Met  Goal: *Absence of Signs of Aspiration on Current Diet  Outcome: Resolved/Met  Goal: *Absence of deep venous thrombosis signs and symptoms(Stroke Metric)  Outcome: Resolved/Met  Goal: *Ability to perform ADLs and demonstrates progressive mobility and function  Outcome: Resolved/Met  Goal: *Stroke education started(Stroke Metric)  Outcome: Resolved/Met  Goal: *Dysphagia screen performed(Stroke Metric)  Outcome: Resolved/Met  Goal: *Rehab consulted(Stroke Metric)  Outcome: Resolved/Met     Problem: TIA/CVA Stroke: Day 2 Until Discharge  Goal: Off Pathway (Use only if patient is Off Pathway)  Outcome: Resolved/Met  Goal: Activity/Safety  Outcome: Resolved/Met  Goal: Diagnostic Test/Procedures  Outcome: Resolved/Met  Goal: Nutrition/Diet  Outcome: Resolved/Met  Goal: Discharge Planning  Outcome: Resolved/Met  Goal: Medications  Outcome: Resolved/Met  Goal: Respiratory  Outcome: Resolved/Met  Goal: Treatments/Interventions/Procedures  Outcome: Resolved/Met  Goal: Psychosocial  Outcome: Resolved/Met  Goal: *Verbalizes anxiety and depression are reduced or absent  Outcome: Resolved/Met  Goal: *Absence of aspiration  Outcome: Resolved/Met  Goal: *Absence of deep venous thrombosis signs and symptoms(Stroke Metric)  Outcome: Resolved/Met  Goal: *Optimal pain control at patient's stated goal  Outcome: Resolved/Met  Goal: *Tolerating diet  Outcome: Resolved/Met  Goal: *Ability to perform ADLs and demonstrates progressive mobility and function  Outcome: Resolved/Met  Goal: *Stroke education continued(Stroke Metric)  Outcome: Resolved/Met     Problem: Ischemic Stroke: Discharge Outcomes  Goal: *Verbalizes anxiety and depression are reduced or absent  Outcome: Resolved/Met  Goal: *Verbalize understanding of risk factor modification(Stroke Metric)  Outcome: Resolved/Met  Goal: *Hemodynamically stable  Outcome: Resolved/Met  Goal: *Absence of aspiration pneumonia  Outcome: Resolved/Met  Goal: *Aware of needed dietary changes  Outcome: Resolved/Met  Goal: *Verbalize understanding of prescribed medications including anti-coagulants, anti-lipid, and/or anti-platelets(Stroke Metric)  Outcome: Resolved/Met  Goal: *Tolerating diet  Outcome: Resolved/Met  Goal: *Aware of follow-up diagnostics related to anticoagulants  Outcome: Resolved/Met  Goal: *Ability to perform ADLs and demonstrates progressive mobility and function  Outcome: Resolved/Met  Goal: *Absence of DVT(Stroke Metric)  Outcome: Resolved/Met  Goal: *Absence of aspiration  Outcome: Resolved/Met  Goal: *Optimal pain control at patient's stated goal  Outcome: Resolved/Met  Goal: *Home safety concerns addressed  Outcome: Resolved/Met  Goal: *Describes available resources and support systems  Outcome: Resolved/Met  Goal: *Verbalizes understanding of activation of EMS(911) for stroke symptoms(Stroke Metric)  Outcome: Resolved/Met  Goal: *Understands and describes signs and symptoms to report to providers(Stroke Metric)  Outcome: Resolved/Met  Goal: *Neurolgocially stable (absence of additional neurological deficits)  Outcome: Resolved/Met  Goal: *Verbalizes importance of follow-up with primary care physician(Stroke Metric)  Outcome: Resolved/Met  Goal: *Smoking cessation discussed,if applicable(Stroke Metric)  Outcome: Resolved/Met  Goal: *Depression screening completed(Stroke Metric)  Outcome: Resolved/Met

## 2023-03-22 NOTE — PROGRESS NOTES
Physician Progress Note      Nat Vega  [facility] #:                  [id number]  :                       1955  ADMIT DATE:       3/18/2023 5:12 PM  [address] [place] DATE:  RESPONDING  PROVIDER #:        Jacqueline Nichole MD          QUERY TEXT:    Patient admitted with CVA. Noted documentation of Acute Kidney Injury in Internal Medicine PN on . Pt Creatinine was 1.31 on , 0.96 on , Pt with no inc of 0.3mg/dl w/in 48hrs or Cr on admission is not 1.5x baseline. In order to support the diagnosis of CHEYANNE, please include additional clinical indicators in your documentation. ? Or please document if the diagnosis of CHEYANNE has been ruled out after further study. The medical record reflects the following:  Risk Factors: CTA Head and neck with contrast, HTN,    Clinical Indicators: Internal Medicine PN,  \"CHEYANNE-Patient with normal renal function on recent labs; Start gentle IV fluid hydration and repeat renal function in a.m\"    Creatinine - 1.31, 0.96, 1.19, 1.06  BUN - 17, 15, 12, 16  GFR - >60    Treatment: 0.9% sodium chloride infusion, BMP    Defined by Kidney Disease Improving Global Outcomes (KDIGO) clinical practice guideline for acute kidney injury:  -Increase in SCr by greater than or equal to 0.3 mg/dl within 48 hours; or  -Increase or decrease in SCr to greater than or equal to 1.5 times baseline, which is known or presumed to have occurred within the prior 7 days; or  -Urine volume < 0.5ml/kg/h for 6 hours. Thank you,  Tiffany Li  Options provided:  -- Acute kidney injury evidenced by, Please document evidence as well as a numerical baseline creatinine, if known.   -- Acute kidney injury ruled out after study  -- Other - I will add my own diagnosis  -- Disagree - Not applicable / Not valid  -- Disagree - Clinically unable to determine / Unknown  -- Refer to Clinical Documentation Reviewer    PROVIDER RESPONSE TEXT:    This patient has acute kidney injury as evidenced by Change in creatinine from 3/18 to 3/19    Query created by: Deb Trinidad on 3/22/2023 8:09 AM      Electronically signed by:  Hellen Peck MD 3/22/2023 10:25 AM

## 2023-03-22 NOTE — DISCHARGE SUMMARY
Physician Discharge Summary     Patient ID:    Donna Goldsmith  [id number]  5 yo  1955    Admit date: 3/18/2023    Donna Goldsmith is a 74 y.o. male who presents with slurred speech and right-sided weakness. Patient's symptoms started this morning, last known well time was around 9 PM last night when he went to bed. Patient presented under code stroke protocol. CT head with perfusion shows no acute pathology. CTA of the head shows no large vessel occlusion, there are patchy areas of vascular narrowing involving the right vertebral artery and left greater than right PCA's which may suggest vasospasm versus noncalcified plaques. Also probable high-grade ostial stenosis of the left vertebral artery. Patient also with pretty elevated blood pressure on presentation with initial blood pressure of 237/165. Patient was evaluated by teleneurology, recommended lowering blood pressure. Patient to be admitted for further evaluation and work-up.     Discharge date and time: 3/22/2023      DISCHARGE CONDITION: Stable        Hospital Diagnoses and Treatment Rendered:       Dysarthria and right-sided weakness secondary to CVA  Continue ASA and statin  Lisinopril 40 nd amlodipine 10 for BP  BP goal <160 and long term <140  Further medication management per rehab physician  ILR implanted       Uncontrolled hypertension  Lisinopril 40 and amlodipine 10  Titrate and add meds per rehab physician     Medication nonadherence  Discussed with patient and family the importance of adherence at this time     Hyponatremia  Resolved     CHEYANNE  Resolved      Hypokalemia  Resolved       Chronic Diagnoses:    Problem List as of 3/22/2023 Never Reviewed            Codes Class Noted - Resolved    CVA (cerebral vascular accident) [facility]) ICD-10-CM: I63.9  ICD-9-CM: 434.91  3/18/2023 - Present           Discharge Medications:       Stable  Current Discharge Medication List        START taking these medications Details   amLODIPine (NORVASC) 10 mg tablet Take 1 Tablet by mouth daily. Qty: 30 Tablet, Refills: 0  Start date: 3/22/2023      aspirin 81 mg chewable tablet Take 1 Tablet by mouth daily. Qty: 30 Tablet, Refills: 0  Start date: 3/23/2023      atorvastatin (LIPITOR) 80 mg tablet Take 1 Tablet by mouth daily. Qty: 30 Tablet, Refills: 0  Start date: 3/23/2023      lisinopriL (PRINIVIL, ZESTRIL) 40 mg tablet Take 1 Tablet by mouth daily. Qty: 30 Tablet, Refills: 0  Start date: 3/23/2023           CONTINUE these medications which have NOT CHANGED    Details   ergocalciferol (ERGOCALCIFEROL) 1,250 mcg (50,000 unit) capsule Take 50,000 Units by mouth every seven (7) days. STOP taking these medications       lisinopril-hydroCHLOROthiazide (PRINZIDE, ZESTORETIC) 20-25 mg per tablet Comments:   Reason for Stopping:         metoprolol succinate (TOPROL-XL) 100 mg tablet Comments:   Reason for Stopping:         rosuvastatin (CRESTOR) 40 mg tablet Comments:   Reason for Stopping: Follow up Care:    1. Other, MD Ayah in 1-2 weeks. Please call to set up an appointment shortly after discharge. Diet:  Low fat, Low cholesterol    Disposition:  [facility]. Advanced Directive:   FULL X   DNR      Discharge Exam:  General : alert x 3, awake, no acute distress,   HEENT: PEERL, EOMI, moist mucus membrane  Neck: supple, no JVD, no meningeal signs  Chest: Clear to auscultation bilaterally   CVS: S1 S2 heard, Capillary refill less than 2 seconds  Abd: soft/ non tender, non distended, BS physiological,   Ext: no clubbing, no cyanosis, no edema, brisk 2+ DP pulses  Neuro/Psych: pleasant mood and affect, expressive aphasia, right upper extremity 0/5, right lower extremity 3-4/5, right facial droop  Skin: warm     CONSULTATIONS: Neurology    Significant Diagnostic Studies:   3/18/2023: BUN 17 MG/DL (Ref range: 6 - 20 MG/DL);  Calcium 9.4 MG/DL (Ref range: 8.5 - 10.1 MG/DL); CO2 31 mmol/L (Ref range: 21 - 32 mmol/L); Creatinine 1.31 MG/DL (H; Ref range: 0.70 - 1.30 MG/DL); Glucose 87 mg/dL (Ref range: 65 - 100 mg/dL); HCT 42.0 % (Ref range: 36.6 - 50.3 %); HGB 14.6 g/dL (Ref range: 12.1 - 17.0 g/dL); Potassium 3.3 mmol/L (L; Ref range: 3.5 - 5.1 mmol/L); Sodium 135 mmol/L (L; Ref range: 136 - 145 mmol/L)  3/19/2023: BUN 15 MG/DL (Ref range: 6 - 20 MG/DL); Calcium 9.4 MG/DL (Ref range: 8.5 - 10.1 MG/DL); CO2 26 mmol/L (Ref range: 21 - 32 mmol/L); Creatinine 0.96 MG/DL (Ref range: 0.70 - 1.30 MG/DL); Glucose 120 mg/dL (H; Ref range: 65 - 100 mg/dL); Potassium 3.1 mmol/L (L; Ref range: 3.5 - 5.1 mmol/L); Sodium 134 mmol/L (L; Ref range: 136 - 145 mmol/L)  Recent Labs     03/22/23  0420 03/21/23  0210   WBC 7.9 7.7   HGB 14.6 14.5   HCT 43.8 42.2    250     Recent Labs     03/22/23  0420 03/21/23  0210 03/20/23  1141 03/20/23  0034    139  --  135*   K 4.0 3.9 3.5 2.7*    112*  --  102   CO2 21 23  --  26   BUN 14 16  --  12   CREA 0.91 1.06  --  1.19   * 106*  --  103*   CA 9.0 9.1  --  9.4     No results for input(s): AP, TBIL, TP, ALB, GLOB, GGT, AML, LPSE in the last 72 hours. No lab exists for component: SGOT, GPT, AMYP, HLPSE  No results for input(s): INR, PTP, APTT, INREXT in the last 72 hours. No results for input(s): FE, TIBC, PSAT, FERR in the last 72 hours. No results for input(s): PH, PCO2, PO2 in the last 72 hours. No results for input(s): CPK, CKMB in the last 72 hours. No lab exists for component: TROPONINI  No components found for: [place]      Greater than 30 minutes spent on discharge.     Signed:  Rosanna Robledo MD  3/22/2023  10:33 AM

## 2023-03-22 NOTE — PROGRESS NOTES
Informed that patient needs RESPIRATORY VIRUS PANEL to go to [facility] and facility will not accept a rapid covid test.

## 2023-03-22 NOTE — PROGRESS NOTES
CRYSTAL PLAN:  RUR-10%  Disposition-[facility] today  Transportation-[facility] 4:00 pm, changed to 5:30 pm  F/U with PCP/Specialist    Patient to have a Resp Panel done today for Covid test result- ordered by attending- Test not ready at 3:30 pm, to expect result in 30 min    Accepting physician is Dr. Lott Bolus  Nurse to call report to [phone] for room 2118    Medicare pt has received, reviewed, and signed 2nd IM letter informing them of their right to appeal the discharge. Signed copied has been placed on pt bedside chart. CM spoke with Genoveva Spencer with [facility], they will have a bed today but to schedule transportation for after 3:30 pm. She will call back with room # and tel. # to call report. Michelle Martinez MSA, RN, CM    3:30 pm. Covid test result not ready, CM notified Genoveva Spencer that result will be ready in 30 min, therefore transportation time was changed to 5:30 pm. Genoveva Spencer said she would continue to await test result.    Michelle Martinez MSA, RN, CM

## 2023-03-22 NOTE — PROGRESS NOTES
TRANSFER - OUT REPORT:    Verbal report given to [employer] (name) on Venus Ware  being transferred to [facility] (unit) for routine progression of care       Report consisted of patients Situation, Background, Assessment and   Recommendations(SBAR). Information from the following report(s) SBAR, Kardex, MAR, Cardiac Rhythm NSR, and Dual Neuro Assessment was reviewed with the receiving nurse. Opportunity for questions and clarification was provided.       Patient transported with:   Patient's medications from home

## 2023-03-23 NOTE — PROGRESS NOTES
Bedside RN performed patient education and medication education. Discharge concerns initiated and discussed with patient, including clarification on \"who\" assists the patient at their home and instructions for when the home going patient should call their provider after discharge. Opportunity for questions and clarification was provided. Patient receptive to education: YES  Patient stated: acceptance   Barriers to Education: none  Diagnosis Education given:  YES    Length of stay: 4  Expected Day of Discharge: 3/22  Ask if they have \"Help at Home\" & add to white board?   YES    Education Day #: 4    Medication Education Given:  NO  M in the box Medication name: asprin    Pt aware of HCAHPS survey: YES          Stroke Education documented in Patient Education: YES  Core Measures Documented in Connect [facility]:  Risk Factors: YES  Warning signs of stroke: YES  When to Activate 911: YES  Medication Education for Risk Factors: YES  Smoking cessation if applicable: YES  Written Education Given:  YES    Discharge NIH Completed: YES  Score: 4    BRAINS: YES    Follow Up Appointment Made: NO  Date/Time if applicable: NA

## 2023-04-18 ENCOUNTER — TELEPHONE (OUTPATIENT)
Dept: CARDIOLOGY CLINIC | Age: 68
End: 2023-04-18

## 2023-04-21 ENCOUNTER — OFFICE VISIT (OUTPATIENT)
Dept: CARDIOLOGY CLINIC | Age: 68
End: 2023-04-21
Payer: COMMERCIAL

## 2023-04-21 DIAGNOSIS — Z95.818 PRESENCE OF CARDIAC DEVICE: Primary | ICD-10-CM

## 2023-04-21 PROCEDURE — 93298 REM INTERROG DEV EVAL SCRMS: CPT | Performed by: INTERNAL MEDICINE

## 2023-04-25 NOTE — PROGRESS NOTES
C/ MDT ILR REMOTE   Scheduled ILR remote transmission.  No events  Chargeable visit  See scanned documents

## 2023-05-10 ENCOUNTER — HOSPITAL ENCOUNTER (OUTPATIENT)
Facility: HOSPITAL | Age: 68
Discharge: HOME | End: 2023-05-13
Payer: COMMERCIAL

## 2023-05-10 ENCOUNTER — OFFICE VISIT (OUTPATIENT)
Dept: PRIMARY CARE CLINIC | Facility: CLINIC | Age: 68
End: 2023-05-10
Payer: COMMERCIAL

## 2023-05-10 VITALS
DIASTOLIC BLOOD PRESSURE: 78 MMHG | TEMPERATURE: 97.9 F | RESPIRATION RATE: 17 BRPM | SYSTOLIC BLOOD PRESSURE: 115 MMHG | OXYGEN SATURATION: 100 % | HEART RATE: 80 BPM | WEIGHT: 172.4 LBS | BODY MASS INDEX: 24.68 KG/M2 | HEIGHT: 70 IN

## 2023-05-10 DIAGNOSIS — I63.89 CEREBROVASCULAR ACCIDENT (CVA) DUE TO OTHER MECHANISM (HCC): Primary | ICD-10-CM

## 2023-05-10 DIAGNOSIS — Z91.81 AT HIGH RISK FOR FALLS: ICD-10-CM

## 2023-05-10 DIAGNOSIS — I25.10 CORONARY ARTERY DISEASE DUE TO CALCIFIED CORONARY LESION: ICD-10-CM

## 2023-05-10 DIAGNOSIS — I25.84 CORONARY ARTERY DISEASE DUE TO CALCIFIED CORONARY LESION: ICD-10-CM

## 2023-05-10 DIAGNOSIS — M25.551 RIGHT HIP PAIN: ICD-10-CM

## 2023-05-10 PROCEDURE — 99204 OFFICE O/P NEW MOD 45 MIN: CPT | Performed by: FAMILY MEDICINE

## 2023-05-10 PROCEDURE — 1123F ACP DISCUSS/DSCN MKR DOCD: CPT | Performed by: FAMILY MEDICINE

## 2023-05-10 PROCEDURE — 73502 X-RAY EXAM HIP UNI 2-3 VIEWS: CPT

## 2023-05-10 RX ORDER — AMLODIPINE BESYLATE 10 MG/1
10 TABLET ORAL DAILY
COMMUNITY

## 2023-05-10 RX ORDER — SERTRALINE HYDROCHLORIDE 25 MG/1
25 TABLET, FILM COATED ORAL DAILY
COMMUNITY

## 2023-05-10 RX ORDER — ATORVASTATIN CALCIUM 80 MG/1
80 TABLET, FILM COATED ORAL DAILY
COMMUNITY

## 2023-05-10 RX ORDER — LISINOPRIL 40 MG/1
40 TABLET ORAL DAILY
COMMUNITY

## 2023-05-10 RX ORDER — ASPIRIN 81 MG/1
81 TABLET, CHEWABLE ORAL DAILY
COMMUNITY

## 2023-05-10 SDOH — ECONOMIC STABILITY: FOOD INSECURITY: WITHIN THE PAST 12 MONTHS, THE FOOD YOU BOUGHT JUST DIDN'T LAST AND YOU DIDN'T HAVE MONEY TO GET MORE.: PATIENT DECLINED

## 2023-05-10 SDOH — ECONOMIC STABILITY: HOUSING INSECURITY
IN THE LAST 12 MONTHS, WAS THERE A TIME WHEN YOU DID NOT HAVE A STEADY PLACE TO SLEEP OR SLEPT IN A SHELTER (INCLUDING NOW)?: PATIENT REFUSED

## 2023-05-10 SDOH — ECONOMIC STABILITY: FOOD INSECURITY: WITHIN THE PAST 12 MONTHS, YOU WORRIED THAT YOUR FOOD WOULD RUN OUT BEFORE YOU GOT MONEY TO BUY MORE.: PATIENT DECLINED

## 2023-05-10 SDOH — ECONOMIC STABILITY: INCOME INSECURITY: HOW HARD IS IT FOR YOU TO PAY FOR THE VERY BASICS LIKE FOOD, HOUSING, MEDICAL CARE, AND HEATING?: PATIENT DECLINED

## 2023-05-10 ASSESSMENT — PATIENT HEALTH QUESTIONNAIRE - PHQ9
2. FEELING DOWN, DEPRESSED OR HOPELESS: 0
SUM OF ALL RESPONSES TO PHQ QUESTIONS 1-9: 0
1. LITTLE INTEREST OR PLEASURE IN DOING THINGS: 0
SUM OF ALL RESPONSES TO PHQ9 QUESTIONS 1 & 2: 0
SUM OF ALL RESPONSES TO PHQ QUESTIONS 1-9: 0

## 2023-05-10 NOTE — PROGRESS NOTES
1. \"Have you been to the ER, urgent care clinic since your last visit? Hospitalized since your last visit? \" No    2. \"Have you seen or consulted any other health care providers outside of the [address] [place] [address] since your last visit? \" No     3. For patients aged 39-70: Has the patient had a colonoscopy / FIT/ Cologuard? Yes - no Care Gap present      If the patient is female:    4. For patients aged 41-77: Has the patient had a mammogram within the past 2 years? NA - based on age or sex      11. For patients aged 21-65: Has the patient had a pap smear?  NA - based on age or sex
Ht 5' 10\" (1.778 m)   Wt 172 lb 6.4 oz (78.2 kg)   SpO2 100%   BMI 24.74 kg/m²   Physical Exam  Vitals reviewed. Constitutional:       Appearance: Normal appearance. HENT:      Head: Normocephalic and atraumatic. Eyes:      Conjunctiva/sclera: Conjunctivae normal.      Pupils: Pupils are equal, round, and reactive to light. Cardiovascular:      Rate and Rhythm: Normal rate and regular rhythm. Pulses: Normal pulses. Heart sounds: Normal heart sounds. Pulmonary:      Effort: Pulmonary effort is normal.      Breath sounds: Normal breath sounds. Musculoskeletal:      Right lower leg: No edema. Left lower leg: No edema. Comments: Tender and edematous over right buttock area. Tender over right lateral hip. Bilat LE sensation to light touch, movement and peripheral pulses intact. Pain with r hip external rotation. Skin:     General: Skin is warm and dry. Neurological:      Mental Status: He is alert. Comments: Right sided weakness. Gait is slowed. Ambulating with a walker. Assessment / Jaxon Calixto was seen today for establish care. Diagnoses and all orders for this visit:    Cerebrovascular accident (CVA) due to other mechanism [facility])    Coronary artery disease due to calcified coronary lesion  -     Nithin Hyman MD, Cardiology, [place] ([facility])    At high risk for falls    Right hip pain  -     XR HIP RIGHT (2-3 VIEWS); Future       Follow up with cardiology. Loop recorder device in place. Cont PT/OT. [address] [facility] discharge note and results reviewed. Stat xray right hip today and follow up as indicated. Advised to avoid exacerbating activities. Suggested ER visit to evaluate hip pain, but they decline this today. I have discussed the diagnosis with the patient and the intended plan as seen in the above orders. I have discussed medication side effects and warnings with the patient as well.     Doreen

## 2023-06-02 ENCOUNTER — NURSE ONLY (OUTPATIENT)
Age: 68
End: 2023-06-02

## 2023-06-02 DIAGNOSIS — Z95.818 STATUS POST PLACEMENT OF IMPLANTABLE LOOP RECORDER: Primary | ICD-10-CM

## 2023-06-02 NOTE — PROGRESS NOTES
Scheduled ILR remote transmission. 130 AF episodes recorded- on Xarelto.    Chargeable visit  See scanned documents

## 2023-06-09 ENCOUNTER — TELEPHONE (OUTPATIENT)
Age: 68
End: 2023-06-09

## 2023-06-09 NOTE — TELEPHONE ENCOUNTER
6/9/23 LM for pt of appt  with Dr. Lozano Estimable on 6/26/23 at [facility] [phone]

## 2023-06-25 PROBLEM — I10 PRIMARY HYPERTENSION: Status: ACTIVE | Noted: 2023-06-25

## 2023-06-25 PROBLEM — I48.0 PAF (PAROXYSMAL ATRIAL FIBRILLATION) (HCC): Status: ACTIVE | Noted: 2023-06-25

## 2023-06-25 PROBLEM — Z95.818 IMPLANTABLE LOOP RECORDER PRESENT: Status: ACTIVE | Noted: 2023-06-25

## 2023-09-26 NOTE — TELEPHONE ENCOUNTER
Requested Prescriptions     Signed Prescriptions Disp Refills    rivaroxaban (XARELTO) 20 MG TABS tablet 90 tablet 2     Sig: Take 1 tablet by mouth Daily with supper     Authorizing Provider: KELVIN NUR     Ordering User: Marcella Ruiz     Refill per verbal order Dr. Cecily Gomez.    Last visit:3/18/23  Next visit: 3/26/24  Xarelto started on 4/18/23 per Dr. Sheila Escoto.

## 2023-10-26 PROCEDURE — G2066 INTER DEVC REMOTE 30D: HCPCS | Performed by: INTERNAL MEDICINE

## 2023-10-26 PROCEDURE — 93298 REM INTERROG DEV EVAL SCRMS: CPT | Performed by: INTERNAL MEDICINE

## 2023-11-28 PROCEDURE — 93298 REM INTERROG DEV EVAL SCRMS: CPT | Performed by: INTERNAL MEDICINE

## 2023-11-30 ENCOUNTER — TELEPHONE (OUTPATIENT)
Age: 68
End: 2023-11-30

## 2023-11-30 NOTE — TELEPHONE ENCOUNTER
11/30/24 LM for pt of appt time and day changed from 3/26/24 to 1/18/24 per Dr. Rendon vic  [phone]

## 2024-01-14 PROCEDURE — 93298 REM INTERROG DEV EVAL SCRMS: CPT | Performed by: INTERNAL MEDICINE

## 2024-01-18 ENCOUNTER — OFFICE VISIT (OUTPATIENT)
Age: 69
End: 2024-01-18
Payer: COMMERCIAL

## 2024-01-18 ENCOUNTER — TELEPHONE (OUTPATIENT)
Age: 69
End: 2024-01-18

## 2024-01-18 VITALS
WEIGHT: 167 LBS | BODY MASS INDEX: 23.91 KG/M2 | OXYGEN SATURATION: 100 % | HEIGHT: 70 IN | SYSTOLIC BLOOD PRESSURE: 142 MMHG | HEART RATE: 71 BPM | DIASTOLIC BLOOD PRESSURE: 82 MMHG

## 2024-01-18 DIAGNOSIS — I10 PRIMARY HYPERTENSION: ICD-10-CM

## 2024-01-18 DIAGNOSIS — I48.0 PAF (PAROXYSMAL ATRIAL FIBRILLATION) (HCC): Primary | ICD-10-CM

## 2024-01-18 DIAGNOSIS — Z95.818 IMPLANTABLE LOOP RECORDER PRESENT: ICD-10-CM

## 2024-01-18 DIAGNOSIS — I63.9 CEREBROVASCULAR ACCIDENT (CVA), UNSPECIFIED MECHANISM (HCC): ICD-10-CM

## 2024-01-18 DIAGNOSIS — Z95.818 STATUS POST PLACEMENT OF IMPLANTABLE LOOP RECORDER: ICD-10-CM

## 2024-01-18 PROCEDURE — G8420 CALC BMI NORM PARAMETERS: HCPCS | Performed by: INTERNAL MEDICINE

## 2024-01-18 PROCEDURE — 3077F SYST BP >= 140 MM HG: CPT | Performed by: INTERNAL MEDICINE

## 2024-01-18 PROCEDURE — 1123F ACP DISCUSS/DSCN MKR DOCD: CPT | Performed by: INTERNAL MEDICINE

## 2024-01-18 PROCEDURE — 4004F PT TOBACCO SCREEN RCVD TLK: CPT | Performed by: INTERNAL MEDICINE

## 2024-01-18 PROCEDURE — 3079F DIAST BP 80-89 MM HG: CPT | Performed by: INTERNAL MEDICINE

## 2024-01-18 PROCEDURE — 3017F COLORECTAL CA SCREEN DOC REV: CPT | Performed by: INTERNAL MEDICINE

## 2024-01-18 PROCEDURE — G8484 FLU IMMUNIZE NO ADMIN: HCPCS | Performed by: INTERNAL MEDICINE

## 2024-01-18 PROCEDURE — G8427 DOCREV CUR MEDS BY ELIG CLIN: HCPCS | Performed by: INTERNAL MEDICINE

## 2024-01-18 PROCEDURE — 99214 OFFICE O/P EST MOD 30 MIN: CPT | Performed by: INTERNAL MEDICINE

## 2024-01-18 NOTE — PROGRESS NOTES
Cardiac Electrophysiology Office Follow-up    NAME: Cory Roberts   :  1955  MRM:  [id number]    Date:  2024         Assessment and Plan:     1. PAF (paroxysmal atrial fibrillation) (HCC)  2. Status post placement of implantable loop recorder  3. Primary hypertension  4. Implantable loop recorder present  5. Cerebrovascular accident (CVA), unspecified mechanism (HCC)      PAF: underwent ILR implant after cryptogenic CVA in 2023. It was noted on ILR that he is having Afib and he was started on Xarelto daily. In may 2023 he had a fall and hematoma to right buttocks, he went to the hospital after not being able to sit down and it was discovered that he had a second stroke per MRI brain (innumerable acute embolic infarcts).   - Echo today EF 60-65% LA normal size, mild to moderate MR,   - elevated CHADS2-VASc score of 4, particularly with multiple prior CVAS will need long term OAC. Also having recurrent falls and prior hematoma, he would benefit from WM implantation.  - The patient has non-valvular AF with a CHADS2 score > 2 or [id number] > 3.  The patient is appropriate for short-term treatment with anticoagulation but unsuitable for long-term anticoagulation. We had a shared-decision making discussion about risk of thrombosis from AF and risk of bleeding from anticoagulation using a decision-making tool to calculate their risk and discussed appropriate rationale for Watchman left atrial appendage closure as an alternative. www.healthdecision.org/tool.html#/tool/afib.   - patient declined WM implant at this time  - also wants his Loop to be explanted  - emphasized importance of OAC compliance  - schedule for loop explant    Anticoagulation: on Xarelto. He did have a fall in May that resulted in a Hematoma to his right buttocks. Per his wife he does fall often, most recently being last week.   - see above    ILR implant 3/2023 after cryptogenic CVA: It showed afib and he was started on Xarelto.

## 2024-02-15 DIAGNOSIS — I48.0 PAF (PAROXYSMAL ATRIAL FIBRILLATION) (HCC): ICD-10-CM

## 2024-02-16 LAB
ANION GAP SERPL CALC-SCNC: 5 MMOL/L (ref 5–15)
BASOPHILS # BLD: 0.1 K/UL (ref 0–0.1)
BASOPHILS NFR BLD: 1 % (ref 0–1)
BUN SERPL-MCNC: 11 MG/DL (ref 6–20)
BUN/CREAT SERPL: 11 (ref 12–20)
CALCIUM SERPL-MCNC: 9.4 MG/DL (ref 8.5–10.1)
CHLORIDE SERPL-SCNC: 104 MMOL/L (ref 97–108)
CO2 SERPL-SCNC: 29 MMOL/L (ref 21–32)
CREAT SERPL-MCNC: 1.01 MG/DL (ref 0.7–1.3)
DIFFERENTIAL METHOD BLD: ABNORMAL
EOSINOPHIL # BLD: 0.1 K/UL (ref 0–0.4)
EOSINOPHIL NFR BLD: 1 % (ref 0–7)
ERYTHROCYTE [DISTWIDTH] IN BLOOD BY AUTOMATED COUNT: 13.2 % (ref 11.5–14.5)
GLUCOSE SERPL-MCNC: 89 MG/DL (ref 65–100)
HCT VFR BLD AUTO: 42.9 % (ref 36.6–50.3)
HGB BLD-MCNC: 15.1 G/DL (ref 12.1–17)
IMM GRANULOCYTES # BLD AUTO: 0.1 K/UL (ref 0–0.04)
IMM GRANULOCYTES NFR BLD AUTO: 1 % (ref 0–0.5)
LYMPHOCYTES # BLD: 1.4 K/UL (ref 0.8–3.5)
LYMPHOCYTES NFR BLD: 17 % (ref 12–49)
MCH RBC QN AUTO: 29.9 PG (ref 26–34)
MCHC RBC AUTO-ENTMCNC: 35.2 G/DL (ref 30–36.5)
MCV RBC AUTO: 85 FL (ref 80–99)
MONOCYTES # BLD: 0.7 K/UL (ref 0–1)
MONOCYTES NFR BLD: 8 % (ref 5–13)
NEUTS SEG # BLD: 5.9 K/UL (ref 1.8–8)
NEUTS SEG NFR BLD: 72 % (ref 32–75)
NRBC # BLD: 0 K/UL (ref 0–0.01)
NRBC BLD-RTO: 0 PER 100 WBC
PLATELET # BLD AUTO: 326 K/UL (ref 150–400)
PMV BLD AUTO: 9.8 FL (ref 8.9–12.9)
POTASSIUM SERPL-SCNC: 3.7 MMOL/L (ref 3.5–5.1)
RBC # BLD AUTO: 5.05 M/UL (ref 4.1–5.7)
SODIUM SERPL-SCNC: 138 MMOL/L (ref 136–145)
WBC # BLD AUTO: 8.2 K/UL (ref 4.1–11.1)

## 2024-02-20 ENCOUNTER — PREP FOR PROCEDURE (OUTPATIENT)
Age: 69
End: 2024-02-20

## 2024-02-21 RX ORDER — SODIUM CHLORIDE 9 MG/ML
INJECTION, SOLUTION INTRAVENOUS PRN
OUTPATIENT
Start: 2024-02-21

## 2024-02-21 RX ORDER — SODIUM CHLORIDE 0.9 % (FLUSH) 0.9 %
5-40 SYRINGE (ML) INJECTION PRN
OUTPATIENT
Start: 2024-02-21

## 2024-02-21 RX ORDER — SODIUM CHLORIDE 0.9 % (FLUSH) 0.9 %
5-40 SYRINGE (ML) INJECTION EVERY 12 HOURS SCHEDULED
OUTPATIENT
Start: 2024-02-21

## 2024-03-01 ENCOUNTER — HOSPITAL ENCOUNTER (OUTPATIENT)
Facility: HOSPITAL | Age: 69
Discharge: HOME | End: 2024-03-03
Attending: INTERNAL MEDICINE
Payer: COMMERCIAL

## 2024-03-01 ENCOUNTER — HOSPITAL ENCOUNTER (OUTPATIENT)
Facility: HOSPITAL | Age: 69
Discharge: HOME | End: 2024-03-01
Attending: INTERNAL MEDICINE | Admitting: INTERNAL MEDICINE
Payer: MEDICARE

## 2024-03-01 VITALS
OXYGEN SATURATION: 99 % | DIASTOLIC BLOOD PRESSURE: 107 MMHG | TEMPERATURE: 97.9 F | HEART RATE: 54 BPM | SYSTOLIC BLOOD PRESSURE: 176 MMHG | RESPIRATION RATE: 16 BRPM

## 2024-03-01 DIAGNOSIS — I48.0 PAROXYSMAL ATRIAL FIBRILLATION (HCC): ICD-10-CM

## 2024-03-01 DIAGNOSIS — I63.9 STROKE (HCC): ICD-10-CM

## 2024-03-01 PROCEDURE — 33286 RMVL SUBQ CAR RHYTHM MNTR: CPT | Performed by: INTERNAL MEDICINE

## 2024-03-01 NOTE — PROGRESS NOTES
Cath Lab Recovery Room Arrival Note    Patient arrived to Cardiac Cath/EP Lab Recovery Room for  Loop Recorder Removal Procedure. Staff introduced to patient. Patient identifiers verified with Name and Date of Birth. Procedure verified with patient. Consent forms reviewed and signed by patient or authorized representative and verified. Allergies verified. Patient informed of procedure and plan of care. Questions answered with review.     Patient on cardiac monitor, non-invasive blood pressure, SPO2 monitor. On RA. Patient is A&Ox4. Patient reports no complaints/no distress.

## 2024-03-01 NOTE — DISCHARGE INSTRUCTIONS
Loop Recorder (Linq) Explant Discharge Instructions      MEDICATIONS        Take only the medications prescribed to you at discharge.    ACTIVITY        Return to your normal activity, except as noted below.    Avoid tight clothes or unnecessary pressure over your incision (such as bra straps or seat belts).  If it is tender or sensitive to clothing, cover the incision with a soft dressing or pad.  Questions about driving are individualized and should be discussed with one of the EP Physicians prior to discharge.    SHOWERING        Leave the bandage over your Loop explant site, it can be removed after 7 days.     It is important to keep the bandaged area clean and dry.  You may shower around the site until the bandage is removed in clinic. Thereafter, you may shower after the bandage is removed, washing it gently with soap and water. Do not apply any lotions, powders, or perfumes to the incision line.    Avoid submerging your incision in water (tub baths, hot tubs, or swimming) for two weeks.     SYMPTOMS THAT NEED TO BE REPORTED IMMEDIATELY        Temperature more than 100.4 F    Redness or warmth at the incision site, or pain for longer than the first 5 days after the implant.    Drainage from the incision site.    Swelling around the incision site.    REMEMBER: If you feel something is an emergency or cannot be handled over the phone, call 911 or go to the closest emergency room.      Satya Rendon MD  Cardiac Electrophysiology / Cardiology    [facility]  [facility]  [address] [address], [address]        [address] [place] [address], [address]  [place], [place]  [id number] [place] [address]  [address] [phone] / [phone] Fax       [phone] / [phone] Fax

## 2024-03-01 NOTE — PROCEDURES
Loop Explant    Procedure Date: 03/01/24  Lab Physician: Joselyn Rendon MD    INDICATIONS:  History of CVA and AF, now on OAC with discomfort now referred for loop explant.    COMMENTS:  Local anesthetic was delivered subcutaneously and a 1 inch horizontal incision was made directly over the LINQ device. The fibrous capsule was identified. The LINQ was easily removed from its pocket. The incision site was closed with a layer of running 4-0 Stratafix subcuticular stitch. Dermabond was applied over the incision. The wound was covered with a dry sterile gauze and Tegaderm. The patient tolerated the procedure well and left the laboratory in good condition.    CONCLUSION:  Successful explant of the Medtronic Reveal LINQ insertable cardiac monitor.

## 2024-03-01 NOTE — PROGRESS NOTES
1040- Pt leon PO. Discharge instructions reviewed with pt.  Pt verbalized understanding. Drsg to left chest CDI. PIV removed. Pt advised to take AM BP meds. Discharged to private car with wife.

## 2024-03-01 NOTE — H&P
Cardiovascular:  Regular rate and rhythm, normal S1-S2, no murmurs/rubs/gallops  Abdomen:  Soft, nontender, nondistended, normoactive bowel sounds  Skin:  Intact, no rash  Extremities:, no clubbing, cyanosis, or edema  Musculoskeletal: normal range of motion  Neurological:  Alert and oriented, no focal neurologic deficits  Psychiatric:  Normal mood and affect      Medications:     Current Outpatient Medications   Medication Sig    rivaroxaban (XARELTO) 20 MG TABS tablet Take 1 tablet by mouth Daily with supper    sertraline (ZOLOFT) 25 MG tablet Take 1 tablet by mouth daily    lisinopril (PRINIVIL;ZESTRIL) 40 MG tablet Take 1 tablet by mouth daily    atorvastatin (LIPITOR) 80 MG tablet Take 1 tablet by mouth daily    amLODIPine (NORVASC) 10 MG tablet Take 1 tablet by mouth daily    aspirin 81 MG chewable tablet Take 1 tablet by mouth daily    acetaminophen (TYLENOL) 80 MG TBDP Take 1 tablet by mouth every 4 hours as needed for Pain     No current facility-administered medications for this encounter.      Diagnostic Data Review:     No results found for this or any previous visit.    No results found for this or any previous visit.    No results found for this or any previous visit.    [unfilled]   No specialty comments available.        Lab Review:     Lab Results   Component Value Date/Time    CHOL 238 03/19/2023 03:07 AM    HDL 39 03/19/2023 03:07 AM     No results found for: \"JAYMIE\", \"CREAPOC\", \"CREA\"  Lab Results   Component Value Date/Time    BUN 11 02/15/2024 03:51 PM     Lab Results   Component Value Date/Time    K 3.7 02/15/2024 03:51 PM     No results found for: \"HBA1C\"  Lab Results   Component Value Date/Time    HGB 15.1 02/15/2024 03:51 PM     Lab Results   Component Value Date/Time     02/15/2024 03:51 PM     No results for input(s): \"CPK\", \"CKMB\" in the last 72 hours.    Invalid input(s): \"CKQMB\", \"CPKMB\", \"TROIQ\"             ___________________________________________________    Joselyn Hernadez

## 2024-04-26 ENCOUNTER — COMMUNITY OUTREACH (OUTPATIENT)
Dept: PRIMARY CARE CLINIC | Facility: CLINIC | Age: 69
End: 2024-04-26

## 2024-08-18 SDOH — HEALTH STABILITY: PHYSICAL HEALTH
ON AVERAGE, HOW MANY DAYS PER WEEK DO YOU ENGAGE IN MODERATE TO STRENUOUS EXERCISE (LIKE A BRISK WALK)?: PATIENT DECLINED

## 2024-08-18 SDOH — ECONOMIC STABILITY: FOOD INSECURITY: WITHIN THE PAST 12 MONTHS, YOU WORRIED THAT YOUR FOOD WOULD RUN OUT BEFORE YOU GOT MONEY TO BUY MORE.: NEVER TRUE

## 2024-08-18 SDOH — ECONOMIC STABILITY: INCOME INSECURITY: HOW HARD IS IT FOR YOU TO PAY FOR THE VERY BASICS LIKE FOOD, HOUSING, MEDICAL CARE, AND HEATING?: NOT HARD AT ALL

## 2024-08-18 SDOH — ECONOMIC STABILITY: FOOD INSECURITY: WITHIN THE PAST 12 MONTHS, THE FOOD YOU BOUGHT JUST DIDN'T LAST AND YOU DIDN'T HAVE MONEY TO GET MORE.: NEVER TRUE

## 2024-08-18 SDOH — ECONOMIC STABILITY: TRANSPORTATION INSECURITY
IN THE PAST 12 MONTHS, HAS LACK OF TRANSPORTATION KEPT YOU FROM MEETINGS, WORK, OR FROM GETTING THINGS NEEDED FOR DAILY LIVING?: NO

## 2024-08-18 SDOH — HEALTH STABILITY: PHYSICAL HEALTH: ON AVERAGE, HOW MANY MINUTES DO YOU ENGAGE IN EXERCISE AT THIS LEVEL?: 20 MIN

## 2024-08-18 ASSESSMENT — LIFESTYLE VARIABLES
HOW OFTEN DO YOU HAVE A DRINK CONTAINING ALCOHOL: NEVER
HOW MANY STANDARD DRINKS CONTAINING ALCOHOL DO YOU HAVE ON A TYPICAL DAY: 0
HOW MANY STANDARD DRINKS CONTAINING ALCOHOL DO YOU HAVE ON A TYPICAL DAY: PATIENT DOES NOT DRINK
HOW OFTEN DO YOU HAVE SIX OR MORE DRINKS ON ONE OCCASION: 1
HOW OFTEN DO YOU HAVE A DRINK CONTAINING ALCOHOL: 1

## 2024-08-18 ASSESSMENT — PATIENT HEALTH QUESTIONNAIRE - PHQ9
2. FEELING DOWN, DEPRESSED OR HOPELESS: NOT AT ALL
SUM OF ALL RESPONSES TO PHQ QUESTIONS 1-9: 1
1. LITTLE INTEREST OR PLEASURE IN DOING THINGS: SEVERAL DAYS
SUM OF ALL RESPONSES TO PHQ9 QUESTIONS 1 & 2: 1
SUM OF ALL RESPONSES TO PHQ QUESTIONS 1-9: 1

## 2024-08-18 NOTE — PROGRESS NOTES
Subjective  Cory Roberts is an 68 y.o. male who presents for follow up.      Pmhx : CAD, CVA, HTN, HLD, Vit D def.    CVA 3/2023 causing dysarthria and right sided weakness.   Hospitalized in May 2023 due to CVA, multiple acute embolic infarcts.  Following with neurology    Parox afib.   Echo in January: EF 60-65% LA normal size, mild to moderate MR,  Following with cardiology  On Xarelto.    HTN : Home blood pressures are elevated.  Tolerating medications well.      Denies any signs of bleeding.     Unsteady gait, ambulates with a rolling walker.    Lives at home with his wife.    Decreased appetite and decreased PO intake in recent months. No appreciated trouble swallowing.  No change in BM.   No signs of bleeding.   No recent falls.   He does not seem depressed.  Denies anhedonia.  He previously was on sertraline.  Wife has discontinued this medication and notes his mood has been stable with no appreciated changes off the medication.      Allergies - reviewed:   No Known Allergies      Medications - reviewed:   Current Outpatient Medications   Medication Sig    metoprolol succinate (TOPROL XL) 100 MG extended release tablet Take 1 tablet by mouth nightly    atorvastatin (LIPITOR) 80 MG tablet Take 1 tablet by mouth daily    hydroCHLOROthiazide 12.5 MG capsule Take 1 capsule by mouth every morning    rivaroxaban (XARELTO) 20 MG TABS tablet Take 1 tablet by mouth Daily with supper    lisinopril (PRINIVIL;ZESTRIL) 40 MG tablet Take 1 tablet by mouth daily    amLODIPine (NORVASC) 10 MG tablet Take 1 tablet by mouth daily    aspirin 81 MG chewable tablet Take 1 tablet by mouth daily    acetaminophen (TYLENOL) 80 MG TBDP Take 1 tablet by mouth every 4 hours as needed for Pain    sertraline (ZOLOFT) 25 MG tablet Take 1 tablet by mouth daily (Patient not taking: Reported on 8/19/2024)     No current facility-administered medications for this visit.       ROS  CONSTITUTIONAL: Denies fever.  CARDIOVASCULAR: Denies chest

## 2024-08-19 ENCOUNTER — OFFICE VISIT (OUTPATIENT)
Dept: PRIMARY CARE CLINIC | Facility: CLINIC | Age: 69
End: 2024-08-19
Payer: COMMERCIAL

## 2024-08-19 VITALS
WEIGHT: 156 LBS | BODY MASS INDEX: 22.33 KG/M2 | HEART RATE: 59 BPM | TEMPERATURE: 97.1 F | DIASTOLIC BLOOD PRESSURE: 96 MMHG | SYSTOLIC BLOOD PRESSURE: 146 MMHG | OXYGEN SATURATION: 96 % | RESPIRATION RATE: 16 BRPM | HEIGHT: 70 IN

## 2024-08-19 DIAGNOSIS — Z86.73 HISTORY OF CARDIOEMBOLIC CEREBROVASCULAR ACCIDENT (CVA): ICD-10-CM

## 2024-08-19 DIAGNOSIS — I10 PRIMARY HYPERTENSION: ICD-10-CM

## 2024-08-19 DIAGNOSIS — R26.81 UNSTEADY GAIT: Primary | ICD-10-CM

## 2024-08-19 DIAGNOSIS — I48.0 PAF (PAROXYSMAL ATRIAL FIBRILLATION) (HCC): ICD-10-CM

## 2024-08-19 DIAGNOSIS — D68.69 SECONDARY HYPERCOAGULABLE STATE (HCC): ICD-10-CM

## 2024-08-19 DIAGNOSIS — Z91.81 AT HIGH RISK FOR FALLS: ICD-10-CM

## 2024-08-19 DIAGNOSIS — R26.81 UNSTEADY GAIT: ICD-10-CM

## 2024-08-19 DIAGNOSIS — R63.4 WEIGHT LOSS: ICD-10-CM

## 2024-08-19 LAB
25(OH)D3 SERPL-MCNC: 14.9 NG/ML (ref 30–100)
ALBUMIN SERPL-MCNC: 4.2 G/DL (ref 3.5–5)
ALBUMIN/GLOB SERPL: 1.6 (ref 1.1–2.2)
ALP SERPL-CCNC: 82 U/L (ref 45–117)
ALT SERPL-CCNC: 29 U/L (ref 12–78)
ANION GAP SERPL CALC-SCNC: 0 MMOL/L (ref 5–15)
AST SERPL-CCNC: 14 U/L (ref 15–37)
BILIRUB SERPL-MCNC: 0.6 MG/DL (ref 0.2–1)
BUN SERPL-MCNC: 14 MG/DL (ref 6–20)
BUN/CREAT SERPL: 14 (ref 12–20)
CALCIUM SERPL-MCNC: 9.6 MG/DL (ref 8.5–10.1)
CHLORIDE SERPL-SCNC: 103 MMOL/L (ref 97–108)
CO2 SERPL-SCNC: 35 MMOL/L (ref 21–32)
CREAT SERPL-MCNC: 1.01 MG/DL (ref 0.7–1.3)
ERYTHROCYTE [DISTWIDTH] IN BLOOD BY AUTOMATED COUNT: 13.2 % (ref 11.5–14.5)
GLOBULIN SER CALC-MCNC: 2.7 G/DL (ref 2–4)
GLUCOSE SERPL-MCNC: 83 MG/DL (ref 65–100)
HCT VFR BLD AUTO: 44.6 % (ref 36.6–50.3)
HGB BLD-MCNC: 15 G/DL (ref 12.1–17)
MCH RBC QN AUTO: 29.6 PG (ref 26–34)
MCHC RBC AUTO-ENTMCNC: 33.6 G/DL (ref 30–36.5)
MCV RBC AUTO: 88.1 FL (ref 80–99)
NRBC # BLD: 0 K/UL (ref 0–0.01)
NRBC BLD-RTO: 0 PER 100 WBC
PLATELET # BLD AUTO: 259 K/UL (ref 150–400)
PMV BLD AUTO: 10.2 FL (ref 8.9–12.9)
POTASSIUM SERPL-SCNC: 3.8 MMOL/L (ref 3.5–5.1)
PROT SERPL-MCNC: 6.9 G/DL (ref 6.4–8.2)
RBC # BLD AUTO: 5.06 M/UL (ref 4.1–5.7)
SODIUM SERPL-SCNC: 138 MMOL/L (ref 136–145)
TSH SERPL DL<=0.05 MIU/L-ACNC: 1.16 UIU/ML (ref 0.36–3.74)
VIT B12 SERPL-MCNC: 621 PG/ML (ref 193–986)
WBC # BLD AUTO: 8.1 K/UL (ref 4.1–11.1)

## 2024-08-19 PROCEDURE — 3080F DIAST BP >= 90 MM HG: CPT | Performed by: FAMILY MEDICINE

## 2024-08-19 PROCEDURE — 3077F SYST BP >= 140 MM HG: CPT | Performed by: FAMILY MEDICINE

## 2024-08-19 PROCEDURE — 1123F ACP DISCUSS/DSCN MKR DOCD: CPT | Performed by: FAMILY MEDICINE

## 2024-08-19 PROCEDURE — 99214 OFFICE O/P EST MOD 30 MIN: CPT | Performed by: FAMILY MEDICINE

## 2024-08-19 RX ORDER — HYDROCHLOROTHIAZIDE 12.5 MG/1
12.5 CAPSULE, GELATIN COATED ORAL EVERY MORNING
Qty: 90 CAPSULE | Refills: 1 | Status: SHIPPED | OUTPATIENT
Start: 2024-08-19

## 2024-08-19 RX ORDER — METOPROLOL SUCCINATE 100 MG/1
1 TABLET, EXTENDED RELEASE ORAL NIGHTLY
COMMUNITY

## 2024-08-19 RX ORDER — ATORVASTATIN CALCIUM 80 MG/1
80 TABLET, FILM COATED ORAL DAILY
Qty: 90 TABLET | Refills: 0 | Status: SHIPPED | OUTPATIENT
Start: 2024-08-19

## 2024-08-19 NOTE — PROGRESS NOTES
\"Have you been to the ER, urgent care clinic since your last visit?  Hospitalized since your last visit?\"    NO    “Have you seen or consulted any other health care providers outside of [facility] since your last visit?”    NO        “Have you had a colorectal cancer screening such as a colonoscopy/FIT/Cologuard?    YES - Type: Colonoscopy - Where: patient stated he had at age 52 Nurse/CMA to request most recent records if not in the chart     No colonoscopy on file  Date of last Cologuard: 12/20/2020  No FIT/FOBT on file   No flexible sigmoidoscopy on file         Click Here for Release of Records Request

## 2024-08-22 RX ORDER — ERGOCALCIFEROL 1.25 MG/1
50000 CAPSULE ORAL WEEKLY
Qty: 8 CAPSULE | Refills: 0 | Status: SHIPPED | OUTPATIENT
Start: 2024-08-22

## 2025-06-03 SDOH — HEALTH STABILITY: PHYSICAL HEALTH: ON AVERAGE, HOW MANY MINUTES DO YOU ENGAGE IN EXERCISE AT THIS LEVEL?: 0 MIN

## 2025-06-03 SDOH — HEALTH STABILITY: PHYSICAL HEALTH: ON AVERAGE, HOW MANY DAYS PER WEEK DO YOU ENGAGE IN MODERATE TO STRENUOUS EXERCISE (LIKE A BRISK WALK)?: 0 DAYS

## 2025-06-04 ENCOUNTER — OFFICE VISIT (OUTPATIENT)
Dept: PRIMARY CARE CLINIC | Facility: CLINIC | Age: 70
End: 2025-06-04
Payer: COMMERCIAL

## 2025-06-04 VITALS
RESPIRATION RATE: 16 BRPM | HEIGHT: 70 IN | OXYGEN SATURATION: 99 % | SYSTOLIC BLOOD PRESSURE: 151 MMHG | TEMPERATURE: 98 F | HEART RATE: 70 BPM | WEIGHT: 147.6 LBS | BODY MASS INDEX: 21.13 KG/M2 | DIASTOLIC BLOOD PRESSURE: 107 MMHG

## 2025-06-04 DIAGNOSIS — E78.00 HYPERCHOLESTEREMIA: ICD-10-CM

## 2025-06-04 DIAGNOSIS — I48.0 PAF (PAROXYSMAL ATRIAL FIBRILLATION) (HCC): ICD-10-CM

## 2025-06-04 DIAGNOSIS — E55.9 VITAMIN D DEFICIENCY: ICD-10-CM

## 2025-06-04 DIAGNOSIS — N40.1 BENIGN PROSTATIC HYPERPLASIA WITH URINARY FREQUENCY: ICD-10-CM

## 2025-06-04 DIAGNOSIS — I10 PRIMARY HYPERTENSION: Primary | ICD-10-CM

## 2025-06-04 DIAGNOSIS — Q24.8 LEFT VENTRICULAR OUTFLOW TRACT OBSTRUCTION: ICD-10-CM

## 2025-06-04 DIAGNOSIS — Z23 NEED FOR VACCINATION: ICD-10-CM

## 2025-06-04 DIAGNOSIS — Z12.5 PROSTATE CANCER SCREENING: ICD-10-CM

## 2025-06-04 DIAGNOSIS — R35.0 BENIGN PROSTATIC HYPERPLASIA WITH URINARY FREQUENCY: ICD-10-CM

## 2025-06-04 DIAGNOSIS — I65.02 STENOSIS OF LEFT VERTEBRAL ARTERY: ICD-10-CM

## 2025-06-04 DIAGNOSIS — Z86.73 HISTORY OF CARDIOEMBOLIC CEREBROVASCULAR ACCIDENT (CVA): ICD-10-CM

## 2025-06-04 LAB
CHOLEST SERPL-MCNC: 194 MG/DL
HDLC SERPL-MCNC: 38 MG/DL
HDLC SERPL: 5.1 (ref 0–5)
LDLC SERPL CALC-MCNC: 132.4 MG/DL (ref 0–100)
PSA SERPL-MCNC: 2 NG/ML (ref 0.01–4)
TRIGL SERPL-MCNC: 118 MG/DL
VLDLC SERPL CALC-MCNC: 23.6 MG/DL

## 2025-06-04 PROCEDURE — 3080F DIAST BP >= 90 MM HG: CPT | Performed by: INTERNAL MEDICINE

## 2025-06-04 PROCEDURE — 3077F SYST BP >= 140 MM HG: CPT | Performed by: INTERNAL MEDICINE

## 2025-06-04 PROCEDURE — 90677 PCV20 VACCINE IM: CPT | Performed by: INTERNAL MEDICINE

## 2025-06-04 PROCEDURE — 99215 OFFICE O/P EST HI 40 MIN: CPT | Performed by: INTERNAL MEDICINE

## 2025-06-04 PROCEDURE — 1123F ACP DISCUSS/DSCN MKR DOCD: CPT | Performed by: INTERNAL MEDICINE

## 2025-06-04 PROCEDURE — 1126F AMNT PAIN NOTED NONE PRSNT: CPT | Performed by: INTERNAL MEDICINE

## 2025-06-04 PROCEDURE — 1159F MED LIST DOCD IN RCRD: CPT | Performed by: INTERNAL MEDICINE

## 2025-06-04 PROCEDURE — 90471 IMMUNIZATION ADMIN: CPT | Performed by: INTERNAL MEDICINE

## 2025-06-04 RX ORDER — ERGOCALCIFEROL 1.25 MG/1
50000 CAPSULE, LIQUID FILLED ORAL WEEKLY
Qty: 12 CAPSULE | Refills: 1 | Status: SHIPPED | OUTPATIENT
Start: 2025-06-04

## 2025-06-04 RX ORDER — FINASTERIDE 5 MG/1
5 TABLET, FILM COATED ORAL DAILY
Qty: 30 TABLET | Refills: 3 | Status: SHIPPED | OUTPATIENT
Start: 2025-06-04

## 2025-06-04 SDOH — ECONOMIC STABILITY: FOOD INSECURITY: WITHIN THE PAST 12 MONTHS, YOU WORRIED THAT YOUR FOOD WOULD RUN OUT BEFORE YOU GOT MONEY TO BUY MORE.: NEVER TRUE

## 2025-06-04 SDOH — ECONOMIC STABILITY: FOOD INSECURITY: WITHIN THE PAST 12 MONTHS, THE FOOD YOU BOUGHT JUST DIDN'T LAST AND YOU DIDN'T HAVE MONEY TO GET MORE.: NEVER TRUE

## 2025-06-04 ASSESSMENT — PATIENT HEALTH QUESTIONNAIRE - PHQ9
SUM OF ALL RESPONSES TO PHQ QUESTIONS 1-9: 0
SUM OF ALL RESPONSES TO PHQ QUESTIONS 1-9: 0
2. FEELING DOWN, DEPRESSED OR HOPELESS: NOT AT ALL
SUM OF ALL RESPONSES TO PHQ QUESTIONS 1-9: 0
1. LITTLE INTEREST OR PLEASURE IN DOING THINGS: NOT AT ALL
SUM OF ALL RESPONSES TO PHQ QUESTIONS 1-9: 0

## 2025-06-04 NOTE — PROGRESS NOTES
Have you been to the ER, urgent care clinic since your last visit?  Hospitalized since your last visit?   NO    Have you seen or consulted any other health care providers outside our system since your last visit?   NO    “Have you had a colorectal cancer screening such as a colonoscopy/FIT/Cologuard?    NO    No colonoscopy on file  Date of last Cologuard: 12/20/2020  No FIT/FOBT on file   No flexible sigmoidoscopy on file          
screening  -     PSA Screening; Future  10. Hypercholesteremia  -     Lipid Panel; Future     Monitor blood pressure , low salt diet    Explained to patient risk benefits of the medications.Advised patient to stop meds if having any side effects.Pt verbalized understanding of the instructions.    I have discussed the diagnosis with the patient and the intended plan as seen in the above orders.  The patient has received an after-visit summary and questions were answered concerning future plans.  I have discussed medication side effects and warnings with the patient as well. I have reviewed the plan of care with the patient, accepted their input and they are in agreement with the treatment goals.     Reviewed plan of care. Patient has provided input and agrees with goals.    No follow-up provider specified.    Ritesh Fragoso MD

## 2025-06-06 ENCOUNTER — RESULTS FOLLOW-UP (OUTPATIENT)
Dept: PRIMARY CARE CLINIC | Facility: CLINIC | Age: 70
End: 2025-06-06

## 2025-06-13 ENCOUNTER — TELEPHONE (OUTPATIENT)
Age: 70
End: 2025-06-13

## (undated) DEVICE — ADHESIVE SKIN CLOSURE TOP 36 CC HI VISC DERMBND MINI

## (undated) DEVICE — 3M™ TEGADERM™ TRANSPARENT FILM DRESSING FRAME STYLE, 1626W, 4 IN X 4-3/4 IN (10 CM X 12 CM), 50/CT 4CT/CASE: Brand: 3M™ TEGADERM™

## (undated) DEVICE — SPECIAL PROCEDURE DRAPE 32" X 34": Brand: SPECIAL PROCEDURE DRAPE

## (undated) DEVICE — ABSORBABLE WOUND CLOSURE DEVICE: Brand: V-LOC 90

## (undated) DEVICE — SYR 10ML LUER LOK 1/5ML GRAD --

## (undated) DEVICE — SURGICAL PROCEDURE TRAY SUTURE

## (undated) DEVICE — SUTURE COAT VCRL SZ 4-0 L18IN ABSRB UD L19MM PS-2 1/2 CIR J496G

## (undated) DEVICE — Z DISCONTINUED USE 2857930 NEEDLE HYPO 25GA L1.5IN BVL ORIENTED ECLIPSE